# Patient Record
Sex: MALE
[De-identification: names, ages, dates, MRNs, and addresses within clinical notes are randomized per-mention and may not be internally consistent; named-entity substitution may affect disease eponyms.]

---

## 2017-01-13 ENCOUNTER — RX ONLY (RX ONLY)
Age: 37
End: 2017-01-13

## 2017-01-13 ENCOUNTER — DOCTOR'S OFFICE (OUTPATIENT)
Dept: URBAN - NONMETROPOLITAN AREA CLINIC 1 | Facility: CLINIC | Age: 37
Setting detail: OPHTHALMOLOGY
End: 2017-01-13
Payer: COMMERCIAL

## 2017-01-13 DIAGNOSIS — T15.01XA: ICD-10-CM

## 2017-01-13 PROCEDURE — 92004 COMPRE OPH EXAM NEW PT 1/>: CPT | Performed by: OPHTHALMOLOGY

## 2017-01-13 PROCEDURE — 65222 REMOVE FOREIGN BODY FROM EYE: CPT | Performed by: OPHTHALMOLOGY

## 2017-01-13 ASSESSMENT — CONFRONTATIONAL VISUAL FIELD TEST (CVF)
OS_FINDINGS: FULL
OD_FINDINGS: FULL

## 2017-01-13 ASSESSMENT — REFRACTION_MANIFEST
OS_VA2: 20/
OS_VA3: 20/
OD_VA2: 20/
OU_VA: 20/
OD_VA1: 20/
OS_VA1: 20/
OD_VA3: 20/
OD_VA1: 20/
OS_VA2: 20/
OS_VA1: 20/
OD_VA3: 20/
OD_VA2: 20/
OU_VA: 20/
OU_VA: 20/
OS_VA2: 20/
OD_VA1: 20/
OD_VA3: 20/
OS_VA1: 20/
OS_VA3: 20/
OD_VA2: 20/
OS_VA3: 20/

## 2017-01-13 ASSESSMENT — VISUAL ACUITY
OS_BCVA: 20/40-2
OD_BCVA: 20/30

## 2017-01-13 ASSESSMENT — REFRACTION_AUTOREFRACTION
OS_AXIS: 73
OD_CYLINDER: -0.25
OS_CYLINDER: -0.50
OD_AXIS: 20
OD_SPHERE: -2.25
OS_SPHERE: -1.75

## 2017-01-13 ASSESSMENT — REFRACTION_CURRENTRX
OD_OVR_VA: 20/
OD_OVR_VA: 20/
OS_OVR_VA: 20/
OS_OVR_VA: 20/
OD_OVR_VA: 20/
OS_OVR_VA: 20/

## 2017-01-13 ASSESSMENT — LID EXAM ASSESSMENTS: OD_COMMENTS: LID EVERTED NO FB NOTED

## 2017-01-13 ASSESSMENT — CORNEAL TRAUMA - FOREIGN BODY: OD_FOREIGNBODY: PRESENT

## 2017-01-13 ASSESSMENT — SPHEQUIV_DERIVED
OD_SPHEQUIV: -2.375
OS_SPHEQUIV: -2

## 2020-03-23 RX ORDER — PANTOPRAZOLE SODIUM 40 MG/1
40 TABLET, DELAYED RELEASE ORAL DAILY
COMMUNITY

## 2020-03-23 RX ORDER — IBUPROFEN 600 MG/1
TABLET ORAL EVERY 6 HOURS PRN
Status: ON HOLD | COMMUNITY
End: 2020-03-27 | Stop reason: SDUPTHER

## 2020-03-26 ENCOUNTER — ANESTHESIA EVENT (OUTPATIENT)
Dept: PERIOP | Facility: HOSPITAL | Age: 40
End: 2020-03-26
Payer: COMMERCIAL

## 2020-03-26 NOTE — ANESTHESIA PREPROCEDURE EVALUATION
Review of Systems/Medical History  Patient summary reviewed  Chart reviewed  No history of anesthetic complications     Cardiovascular  Negative cardio ROS    Pulmonary  Smoker ,        GI/Hepatic    GERD well controlled,             Endo/Other  Negative endo/other ROS   Obesity    GYN       Hematology  Negative hematology ROS      Musculoskeletal  Negative musculoskeletal ROS        Neurology  Negative neurology ROS      Psychology   Negative psychology ROS              Physical Exam    Airway    Mallampati score: II  TM Distance: >3 FB  Neck ROM: full     Dental       Cardiovascular  Comment: Negative ROS,     Pulmonary      Other Findings        Anesthesia Plan  ASA Score- 2     Anesthesia Type- general with ASA Monitors  Additional Monitors:   Airway Plan: ETT  Plan Factors-    Induction- intravenous  Postoperative Plan-     Informed Consent-   I personally reviewed this patient with the CRNA  Discussed and agreed on the Anesthesia Plan with the CRNA  Iraida Roth

## 2020-03-27 ENCOUNTER — HOSPITAL ENCOUNTER (OUTPATIENT)
Facility: HOSPITAL | Age: 40
Setting detail: OUTPATIENT SURGERY
Discharge: HOME | End: 2020-03-27
Attending: STUDENT IN AN ORGANIZED HEALTH CARE EDUCATION/TRAINING PROGRAM | Admitting: STUDENT IN AN ORGANIZED HEALTH CARE EDUCATION/TRAINING PROGRAM
Payer: COMMERCIAL

## 2020-03-27 ENCOUNTER — ANESTHESIA (OUTPATIENT)
Dept: PERIOP | Facility: HOSPITAL | Age: 40
End: 2020-03-27
Payer: COMMERCIAL

## 2020-03-27 VITALS
OXYGEN SATURATION: 97 % | WEIGHT: 280 LBS | HEART RATE: 69 BPM | RESPIRATION RATE: 17 BRPM | TEMPERATURE: 97.5 F | HEIGHT: 70 IN | BODY MASS INDEX: 40.09 KG/M2 | DIASTOLIC BLOOD PRESSURE: 66 MMHG | SYSTOLIC BLOOD PRESSURE: 163 MMHG

## 2020-03-27 DIAGNOSIS — K80.20 SYMPTOMATIC CHOLELITHIASIS: Primary | ICD-10-CM

## 2020-03-27 DIAGNOSIS — R10.13 EPIGASTRIC PAIN: ICD-10-CM

## 2020-03-27 PROCEDURE — 88304 TISSUE EXAM BY PATHOLOGIST: CPT | Performed by: PATHOLOGY

## 2020-03-27 PROCEDURE — 47562 LAPAROSCOPIC CHOLECYSTECTOMY: CPT | Performed by: PHYSICIAN ASSISTANT

## 2020-03-27 RX ORDER — SODIUM CHLORIDE 9 MG/ML
INJECTION, SOLUTION INTRAVENOUS AS NEEDED
Status: DISCONTINUED | OUTPATIENT
Start: 2020-03-27 | End: 2020-03-27 | Stop reason: HOSPADM

## 2020-03-27 RX ORDER — LIDOCAINE HYDROCHLORIDE 10 MG/ML
INJECTION, SOLUTION EPIDURAL; INFILTRATION; INTRACAUDAL; PERINEURAL AS NEEDED
Status: DISCONTINUED | OUTPATIENT
Start: 2020-03-27 | End: 2020-03-27 | Stop reason: SURG

## 2020-03-27 RX ORDER — NEOSTIGMINE METHYLSULFATE 1 MG/ML
INJECTION INTRAVENOUS AS NEEDED
Status: DISCONTINUED | OUTPATIENT
Start: 2020-03-27 | End: 2020-03-27 | Stop reason: SURG

## 2020-03-27 RX ORDER — KETOROLAC TROMETHAMINE 30 MG/ML
INJECTION, SOLUTION INTRAMUSCULAR; INTRAVENOUS AS NEEDED
Status: DISCONTINUED | OUTPATIENT
Start: 2020-03-27 | End: 2020-03-27 | Stop reason: SURG

## 2020-03-27 RX ORDER — ROCURONIUM BROMIDE 10 MG/ML
INJECTION, SOLUTION INTRAVENOUS AS NEEDED
Status: DISCONTINUED | OUTPATIENT
Start: 2020-03-27 | End: 2020-03-27 | Stop reason: SURG

## 2020-03-27 RX ORDER — SUCCINYLCHOLINE/SOD CL,ISO/PF 100 MG/5ML
SYRINGE (ML) INTRAVENOUS AS NEEDED
Status: DISCONTINUED | OUTPATIENT
Start: 2020-03-27 | End: 2020-03-27 | Stop reason: SURG

## 2020-03-27 RX ORDER — MIDAZOLAM HYDROCHLORIDE 2 MG/2ML
INJECTION, SOLUTION INTRAMUSCULAR; INTRAVENOUS AS NEEDED
Status: DISCONTINUED | OUTPATIENT
Start: 2020-03-27 | End: 2020-03-27 | Stop reason: SURG

## 2020-03-27 RX ORDER — CEFAZOLIN SODIUM 1 G/3ML
INJECTION, POWDER, FOR SOLUTION INTRAMUSCULAR; INTRAVENOUS AS NEEDED
Status: DISCONTINUED | OUTPATIENT
Start: 2020-03-27 | End: 2020-03-27 | Stop reason: SURG

## 2020-03-27 RX ORDER — LIDOCAINE HYDROCHLORIDE AND EPINEPHRINE 10; 10 MG/ML; UG/ML
INJECTION, SOLUTION INFILTRATION; PERINEURAL AS NEEDED
Status: DISCONTINUED | OUTPATIENT
Start: 2020-03-27 | End: 2020-03-27 | Stop reason: HOSPADM

## 2020-03-27 RX ORDER — FENTANYL CITRATE 50 UG/ML
INJECTION, SOLUTION INTRAMUSCULAR; INTRAVENOUS AS NEEDED
Status: DISCONTINUED | OUTPATIENT
Start: 2020-03-27 | End: 2020-03-27 | Stop reason: SURG

## 2020-03-27 RX ORDER — GLYCOPYRROLATE 0.2 MG/ML
INJECTION INTRAMUSCULAR; INTRAVENOUS AS NEEDED
Status: DISCONTINUED | OUTPATIENT
Start: 2020-03-27 | End: 2020-03-27 | Stop reason: SURG

## 2020-03-27 RX ORDER — PROPOFOL 10 MG/ML
INJECTION, EMULSION INTRAVENOUS AS NEEDED
Status: DISCONTINUED | OUTPATIENT
Start: 2020-03-27 | End: 2020-03-27 | Stop reason: SURG

## 2020-03-27 RX ORDER — IBUPROFEN 600 MG/1
600 TABLET ORAL EVERY 6 HOURS PRN
Qty: 30 TABLET | Refills: 0 | Status: SHIPPED | OUTPATIENT
Start: 2020-03-27

## 2020-03-27 RX ORDER — ACETAMINOPHEN 500 MG
1000 TABLET ORAL EVERY 6 HOURS PRN
COMMUNITY

## 2020-03-27 RX ORDER — SODIUM CHLORIDE, SODIUM LACTATE, POTASSIUM CHLORIDE, CALCIUM CHLORIDE 600; 310; 30; 20 MG/100ML; MG/100ML; MG/100ML; MG/100ML
50 INJECTION, SOLUTION INTRAVENOUS CONTINUOUS
Status: DISCONTINUED | OUTPATIENT
Start: 2020-03-27 | End: 2020-03-27 | Stop reason: HOSPADM

## 2020-03-27 RX ORDER — ONDANSETRON 2 MG/ML
INJECTION INTRAMUSCULAR; INTRAVENOUS AS NEEDED
Status: DISCONTINUED | OUTPATIENT
Start: 2020-03-27 | End: 2020-03-27 | Stop reason: SURG

## 2020-03-27 RX ORDER — FENTANYL CITRATE/PF 50 MCG/ML
50 SYRINGE (ML) INJECTION
Status: DISCONTINUED | OUTPATIENT
Start: 2020-03-27 | End: 2020-03-27 | Stop reason: HOSPADM

## 2020-03-27 RX ORDER — DEXAMETHASONE SODIUM PHOSPHATE 10 MG/ML
INJECTION, SOLUTION INTRAMUSCULAR; INTRAVENOUS AS NEEDED
Status: DISCONTINUED | OUTPATIENT
Start: 2020-03-27 | End: 2020-03-27 | Stop reason: SURG

## 2020-03-27 RX ORDER — METOCLOPRAMIDE HYDROCHLORIDE 5 MG/ML
10 INJECTION INTRAMUSCULAR; INTRAVENOUS ONCE AS NEEDED
Status: DISCONTINUED | OUTPATIENT
Start: 2020-03-27 | End: 2020-03-27 | Stop reason: HOSPADM

## 2020-03-27 RX ORDER — ONDANSETRON 2 MG/ML
4 INJECTION INTRAMUSCULAR; INTRAVENOUS ONCE AS NEEDED
Status: DISCONTINUED | OUTPATIENT
Start: 2020-03-27 | End: 2020-03-27 | Stop reason: HOSPADM

## 2020-03-27 RX ORDER — CEFAZOLIN SODIUM 2 G/50ML
2000 SOLUTION INTRAVENOUS
Status: COMPLETED | OUTPATIENT
Start: 2020-03-27 | End: 2020-03-27

## 2020-03-27 RX ADMIN — LIDOCAINE HYDROCHLORIDE 50 MG: 10 INJECTION, SOLUTION EPIDURAL; INFILTRATION; INTRACAUDAL; PERINEURAL at 07:42

## 2020-03-27 RX ADMIN — FENTANYL CITRATE 50 MCG: 50 INJECTION, SOLUTION INTRAMUSCULAR; INTRAVENOUS at 08:16

## 2020-03-27 RX ADMIN — ROCURONIUM BROMIDE 10 MG: 10 INJECTION, SOLUTION INTRAVENOUS at 07:42

## 2020-03-27 RX ADMIN — ROCURONIUM BROMIDE 40 MG: 10 INJECTION, SOLUTION INTRAVENOUS at 07:46

## 2020-03-27 RX ADMIN — FENTANYL CITRATE 50 MCG: 50 INJECTION, SOLUTION INTRAMUSCULAR; INTRAVENOUS at 07:42

## 2020-03-27 RX ADMIN — ONDANSETRON 4 MG: 2 INJECTION INTRAMUSCULAR; INTRAVENOUS at 09:00

## 2020-03-27 RX ADMIN — ROCURONIUM BROMIDE 20 MG: 10 INJECTION, SOLUTION INTRAVENOUS at 08:16

## 2020-03-27 RX ADMIN — CEFAZOLIN 1000 MG: 1 INJECTION, POWDER, FOR SOLUTION INTRAVENOUS at 07:48

## 2020-03-27 RX ADMIN — NEOSTIGMINE METHYLSULFATE 4 MG: 1 INJECTION, SOLUTION INTRAVENOUS at 09:02

## 2020-03-27 RX ADMIN — FENTANYL CITRATE 50 MCG: 50 INJECTION, SOLUTION INTRAMUSCULAR; INTRAVENOUS at 08:27

## 2020-03-27 RX ADMIN — MIDAZOLAM HYDROCHLORIDE 2 MG: 1 INJECTION, SOLUTION INTRAMUSCULAR; INTRAVENOUS at 07:36

## 2020-03-27 RX ADMIN — PROPOFOL 30 MG: 10 INJECTION, EMULSION INTRAVENOUS at 09:15

## 2020-03-27 RX ADMIN — FENTANYL CITRATE 50 MCG: 50 INJECTION, SOLUTION INTRAMUSCULAR; INTRAVENOUS at 08:05

## 2020-03-27 RX ADMIN — GLYCOPYRROLATE 0.4 MG: 0.2 INJECTION, SOLUTION INTRAMUSCULAR; INTRAVENOUS at 09:02

## 2020-03-27 RX ADMIN — DEXAMETHASONE SODIUM PHOSPHATE 4 MG: 10 INJECTION, SOLUTION INTRAMUSCULAR; INTRAVENOUS at 07:52

## 2020-03-27 RX ADMIN — SODIUM CHLORIDE, SODIUM LACTATE, POTASSIUM CHLORIDE, AND CALCIUM CHLORIDE: .6; .31; .03; .02 INJECTION, SOLUTION INTRAVENOUS at 08:34

## 2020-03-27 RX ADMIN — KETOROLAC TROMETHAMINE 30 MG: 30 INJECTION, SOLUTION INTRAMUSCULAR at 09:07

## 2020-03-27 RX ADMIN — Medication 140 MG: at 07:42

## 2020-03-27 RX ADMIN — SODIUM CHLORIDE, SODIUM LACTATE, POTASSIUM CHLORIDE, AND CALCIUM CHLORIDE 50 ML/HR: .6; .31; .03; .02 INJECTION, SOLUTION INTRAVENOUS at 07:32

## 2020-03-27 RX ADMIN — PROPOFOL 300 MG: 10 INJECTION, EMULSION INTRAVENOUS at 07:42

## 2020-03-27 RX ADMIN — FENTANYL CITRATE 50 MCG: 50 INJECTION, SOLUTION INTRAMUSCULAR; INTRAVENOUS at 09:21

## 2020-03-27 RX ADMIN — CEFAZOLIN SODIUM 2000 MG: 2 SOLUTION INTRAVENOUS at 07:36

## 2020-03-27 RX ADMIN — FENTANYL CITRATE 50 MCG: 50 INJECTION, SOLUTION INTRAMUSCULAR; INTRAVENOUS at 08:35

## 2020-03-27 NOTE — ANESTHESIA POSTPROCEDURE EVALUATION
Post-Op Assessment Note    CV Status:  Stable    Pain management: adequate     Mental Status:  Somnolent   Hydration Status:  Euvolemic   PONV Controlled:  Controlled   Airway Patency:  Patent   Post Op Vitals Reviewed: Yes      Staff: CRNA           BP (!) (P) 179/80 (03/27/20 0919)    Temp (P) 97 7 °F (36 5 °C) (03/27/20 0919)    Pulse 61   Resp 18   SpO2 98

## 2020-06-24 ENCOUNTER — APPOINTMENT (EMERGENCY)
Dept: CT IMAGING | Facility: HOSPITAL | Age: 40
End: 2020-06-24

## 2020-06-24 ENCOUNTER — HOSPITAL ENCOUNTER (EMERGENCY)
Facility: HOSPITAL | Age: 40
Discharge: HOME | End: 2020-06-25
Attending: EMERGENCY MEDICINE | Admitting: EMERGENCY MEDICINE

## 2020-06-24 DIAGNOSIS — N20.1 URETEROLITHIASIS: Primary | ICD-10-CM

## 2020-06-24 LAB
ALBUMIN SERPL BCP-MCNC: 4.1 G/DL (ref 3.5–5)
ALP SERPL-CCNC: 83 U/L (ref 46–116)
ALT SERPL W P-5'-P-CCNC: 61 U/L (ref 12–78)
ANION GAP SERPL CALCULATED.3IONS-SCNC: 8 MMOL/L (ref 4–13)
AST SERPL W P-5'-P-CCNC: 32 U/L (ref 5–45)
BACTERIA UR QL AUTO: ABNORMAL /HPF
BASOPHILS # BLD AUTO: 0.04 THOUSANDS/ΜL (ref 0–0.1)
BASOPHILS NFR BLD AUTO: 0 % (ref 0–1)
BILIRUB SERPL-MCNC: 0.6 MG/DL (ref 0.2–1)
BILIRUB UR QL STRIP: NEGATIVE
BUN SERPL-MCNC: 15 MG/DL (ref 5–25)
CALCIUM SERPL-MCNC: 8.9 MG/DL (ref 8.3–10.1)
CHLORIDE SERPL-SCNC: 102 MMOL/L (ref 100–108)
CLARITY UR: CLEAR
CO2 SERPL-SCNC: 28 MMOL/L (ref 21–32)
COLOR UR: YELLOW
CREAT SERPL-MCNC: 1.06 MG/DL (ref 0.6–1.3)
EOSINOPHIL # BLD AUTO: 0.2 THOUSAND/ΜL (ref 0–0.61)
EOSINOPHIL NFR BLD AUTO: 2 % (ref 0–6)
ERYTHROCYTE [DISTWIDTH] IN BLOOD BY AUTOMATED COUNT: 12.5 % (ref 11.6–15.1)
GFR SERPL CREATININE-BSD FRML MDRD: 87 ML/MIN/1.73SQ M
GLUCOSE SERPL-MCNC: 97 MG/DL (ref 65–140)
GLUCOSE UR STRIP-MCNC: NEGATIVE MG/DL
HCT VFR BLD AUTO: 46.2 % (ref 36.5–49.3)
HGB BLD-MCNC: 16 G/DL (ref 12–17)
HGB UR QL STRIP.AUTO: ABNORMAL
IMM GRANULOCYTES # BLD AUTO: 0.06 THOUSAND/UL (ref 0–0.2)
IMM GRANULOCYTES NFR BLD AUTO: 1 % (ref 0–2)
KETONES UR STRIP-MCNC: NEGATIVE MG/DL
LEUKOCYTE ESTERASE UR QL STRIP: NEGATIVE
LYMPHOCYTES # BLD AUTO: 2.03 THOUSANDS/ΜL (ref 0.6–4.47)
LYMPHOCYTES NFR BLD AUTO: 22 % (ref 14–44)
MCH RBC QN AUTO: 32.7 PG (ref 26.8–34.3)
MCHC RBC AUTO-ENTMCNC: 34.6 G/DL (ref 31.4–37.4)
MCV RBC AUTO: 94 FL (ref 82–98)
MONOCYTES # BLD AUTO: 0.92 THOUSAND/ΜL (ref 0.17–1.22)
MONOCYTES NFR BLD AUTO: 10 % (ref 4–12)
NEUTROPHILS # BLD AUTO: 5.83 THOUSANDS/ΜL (ref 1.85–7.62)
NEUTS SEG NFR BLD AUTO: 65 % (ref 43–75)
NITRITE UR QL STRIP: NEGATIVE
NON-SQ EPI CELLS URNS QL MICRO: ABNORMAL /HPF
NRBC BLD AUTO-RTO: 0 /100 WBCS
PH UR STRIP.AUTO: 6 [PH]
PLATELET # BLD AUTO: 233 THOUSANDS/UL (ref 149–390)
PMV BLD AUTO: 10.4 FL (ref 8.9–12.7)
POTASSIUM SERPL-SCNC: 3.8 MMOL/L (ref 3.5–5.3)
PROT SERPL-MCNC: 7.4 G/DL (ref 6.4–8.2)
PROT UR STRIP-MCNC: NEGATIVE MG/DL
RBC # BLD AUTO: 4.9 MILLION/UL (ref 3.88–5.62)
RBC #/AREA URNS AUTO: ABNORMAL /HPF
SODIUM SERPL-SCNC: 138 MMOL/L (ref 136–145)
SP GR UR STRIP.AUTO: <=1.005 (ref 1–1.03)
UROBILINOGEN UR QL STRIP.AUTO: 0.2 E.U./DL
WBC # BLD AUTO: 9.08 THOUSAND/UL (ref 4.31–10.16)
WBC #/AREA URNS AUTO: ABNORMAL /HPF

## 2020-06-24 PROCEDURE — 81001 URINALYSIS AUTO W/SCOPE: CPT | Performed by: EMERGENCY MEDICINE

## 2020-06-24 PROCEDURE — 96374 THER/PROPH/DIAG INJ IV PUSH: CPT

## 2020-06-24 PROCEDURE — 85025 COMPLETE CBC W/AUTO DIFF WBC: CPT | Performed by: EMERGENCY MEDICINE

## 2020-06-24 PROCEDURE — 99284 EMERGENCY DEPT VISIT MOD MDM: CPT | Performed by: EMERGENCY MEDICINE

## 2020-06-24 PROCEDURE — 87086 URINE CULTURE/COLONY COUNT: CPT | Performed by: EMERGENCY MEDICINE

## 2020-06-24 PROCEDURE — 74176 CT ABD & PELVIS W/O CONTRAST: CPT

## 2020-06-24 PROCEDURE — 96361 HYDRATE IV INFUSION ADD-ON: CPT

## 2020-06-24 PROCEDURE — 96375 TX/PRO/DX INJ NEW DRUG ADDON: CPT

## 2020-06-24 PROCEDURE — 80053 COMPREHEN METABOLIC PANEL: CPT | Performed by: EMERGENCY MEDICINE

## 2020-06-24 PROCEDURE — 99284 EMERGENCY DEPT VISIT MOD MDM: CPT

## 2020-06-24 RX ORDER — TAMSULOSIN HYDROCHLORIDE 0.4 MG/1
0.4 CAPSULE ORAL
Qty: 7 CAPSULE | Refills: 0 | Status: SHIPPED | OUTPATIENT
Start: 2020-06-24 | End: 2020-07-01

## 2020-06-24 RX ORDER — ONDANSETRON 2 MG/ML
4 INJECTION INTRAMUSCULAR; INTRAVENOUS ONCE
Status: COMPLETED | OUTPATIENT
Start: 2020-06-24 | End: 2020-06-24

## 2020-06-24 RX ORDER — ONDANSETRON 4 MG/1
4 TABLET, ORALLY DISINTEGRATING ORAL EVERY 8 HOURS PRN
Qty: 20 TABLET | Refills: 0 | Status: SHIPPED | OUTPATIENT
Start: 2020-06-24

## 2020-06-24 RX ORDER — KETOROLAC TROMETHAMINE 30 MG/ML
15 INJECTION, SOLUTION INTRAMUSCULAR; INTRAVENOUS ONCE
Status: COMPLETED | OUTPATIENT
Start: 2020-06-24 | End: 2020-06-24

## 2020-06-24 RX ORDER — TAMSULOSIN HYDROCHLORIDE 0.4 MG/1
0.4 CAPSULE ORAL ONCE
Status: COMPLETED | OUTPATIENT
Start: 2020-06-24 | End: 2020-06-24

## 2020-06-24 RX ADMIN — ONDANSETRON 4 MG: 2 INJECTION INTRAMUSCULAR; INTRAVENOUS at 22:46

## 2020-06-24 RX ADMIN — SODIUM CHLORIDE 1000 ML: 0.9 INJECTION, SOLUTION INTRAVENOUS at 22:44

## 2020-06-24 RX ADMIN — TAMSULOSIN HYDROCHLORIDE 0.4 MG: 0.4 CAPSULE ORAL at 23:54

## 2020-06-24 RX ADMIN — KETOROLAC TROMETHAMINE 15 MG: 30 INJECTION, SOLUTION INTRAMUSCULAR at 22:46

## 2020-06-25 VITALS
DIASTOLIC BLOOD PRESSURE: 86 MMHG | HEART RATE: 71 BPM | SYSTOLIC BLOOD PRESSURE: 136 MMHG | TEMPERATURE: 97.6 F | BODY MASS INDEX: 40.17 KG/M2 | RESPIRATION RATE: 16 BRPM | WEIGHT: 279.98 LBS | OXYGEN SATURATION: 98 %

## 2020-06-26 LAB — BACTERIA UR CULT: NORMAL

## 2020-06-30 ENCOUNTER — HOSPITAL ENCOUNTER (EMERGENCY)
Facility: HOSPITAL | Age: 40
Discharge: HOME | End: 2020-06-30
Attending: EMERGENCY MEDICINE | Admitting: EMERGENCY MEDICINE

## 2020-06-30 ENCOUNTER — APPOINTMENT (EMERGENCY)
Dept: CT IMAGING | Facility: HOSPITAL | Age: 40
End: 2020-06-30

## 2020-06-30 VITALS
DIASTOLIC BLOOD PRESSURE: 86 MMHG | OXYGEN SATURATION: 6 % | SYSTOLIC BLOOD PRESSURE: 155 MMHG | HEART RATE: 71 BPM | TEMPERATURE: 98.2 F | BODY MASS INDEX: 40.17 KG/M2 | RESPIRATION RATE: 19 BRPM | WEIGHT: 279.98 LBS

## 2020-06-30 DIAGNOSIS — N20.0 NEPHROLITHIASIS: ICD-10-CM

## 2020-06-30 DIAGNOSIS — N39.0 UTI (URINARY TRACT INFECTION): Primary | ICD-10-CM

## 2020-06-30 LAB
ANION GAP SERPL CALCULATED.3IONS-SCNC: 11 MMOL/L (ref 4–13)
BACTERIA UR QL AUTO: ABNORMAL /HPF
BASOPHILS # BLD AUTO: 0.04 THOUSANDS/ΜL (ref 0–0.1)
BASOPHILS NFR BLD AUTO: 0 % (ref 0–1)
BILIRUB UR QL STRIP: NEGATIVE
BUN SERPL-MCNC: 20 MG/DL (ref 5–25)
CALCIUM SERPL-MCNC: 9.2 MG/DL (ref 8.3–10.1)
CHLORIDE SERPL-SCNC: 103 MMOL/L (ref 100–108)
CLARITY UR: ABNORMAL
CO2 SERPL-SCNC: 24 MMOL/L (ref 21–32)
COLOR UR: YELLOW
CREAT SERPL-MCNC: 1.48 MG/DL (ref 0.6–1.3)
EOSINOPHIL # BLD AUTO: 0.11 THOUSAND/ΜL (ref 0–0.61)
EOSINOPHIL NFR BLD AUTO: 1 % (ref 0–6)
ERYTHROCYTE [DISTWIDTH] IN BLOOD BY AUTOMATED COUNT: 12.7 % (ref 11.6–15.1)
GFR SERPL CREATININE-BSD FRML MDRD: 58 ML/MIN/1.73SQ M
GLUCOSE SERPL-MCNC: 106 MG/DL (ref 65–140)
GLUCOSE UR STRIP-MCNC: NEGATIVE MG/DL
HCT VFR BLD AUTO: 46 % (ref 36.5–49.3)
HGB BLD-MCNC: 16 G/DL (ref 12–17)
HGB UR QL STRIP.AUTO: ABNORMAL
IMM GRANULOCYTES # BLD AUTO: 0.07 THOUSAND/UL (ref 0–0.2)
IMM GRANULOCYTES NFR BLD AUTO: 1 % (ref 0–2)
KETONES UR STRIP-MCNC: ABNORMAL MG/DL
LEUKOCYTE ESTERASE UR QL STRIP: NEGATIVE
LYMPHOCYTES # BLD AUTO: 1.74 THOUSANDS/ΜL (ref 0.6–4.47)
LYMPHOCYTES NFR BLD AUTO: 13 % (ref 14–44)
MCH RBC QN AUTO: 32.3 PG (ref 26.8–34.3)
MCHC RBC AUTO-ENTMCNC: 34.8 G/DL (ref 31.4–37.4)
MCV RBC AUTO: 93 FL (ref 82–98)
MONOCYTES # BLD AUTO: 1.26 THOUSAND/ΜL (ref 0.17–1.22)
MONOCYTES NFR BLD AUTO: 10 % (ref 4–12)
NEUTROPHILS # BLD AUTO: 9.79 THOUSANDS/ΜL (ref 1.85–7.62)
NEUTS SEG NFR BLD AUTO: 75 % (ref 43–75)
NITRITE UR QL STRIP: NEGATIVE
NON-SQ EPI CELLS URNS QL MICRO: ABNORMAL /HPF
NRBC BLD AUTO-RTO: 0 /100 WBCS
PH UR STRIP.AUTO: 6 [PH]
PLATELET # BLD AUTO: 249 THOUSANDS/UL (ref 149–390)
PMV BLD AUTO: 10.4 FL (ref 8.9–12.7)
POTASSIUM SERPL-SCNC: 3.9 MMOL/L (ref 3.5–5.3)
PROT UR STRIP-MCNC: ABNORMAL MG/DL
RBC # BLD AUTO: 4.95 MILLION/UL (ref 3.88–5.62)
RBC #/AREA URNS AUTO: ABNORMAL /HPF
SODIUM SERPL-SCNC: 138 MMOL/L (ref 136–145)
SP GR UR STRIP.AUTO: >=1.03 (ref 1–1.03)
UROBILINOGEN UR QL STRIP.AUTO: 0.2 E.U./DL
WBC # BLD AUTO: 13.01 THOUSAND/UL (ref 4.31–10.16)
WBC #/AREA URNS AUTO: ABNORMAL /HPF

## 2020-06-30 PROCEDURE — 36415 COLL VENOUS BLD VENIPUNCTURE: CPT | Performed by: EMERGENCY MEDICINE

## 2020-06-30 PROCEDURE — 99284 EMERGENCY DEPT VISIT MOD MDM: CPT

## 2020-06-30 PROCEDURE — 96375 TX/PRO/DX INJ NEW DRUG ADDON: CPT

## 2020-06-30 PROCEDURE — 96361 HYDRATE IV INFUSION ADD-ON: CPT

## 2020-06-30 PROCEDURE — 81001 URINALYSIS AUTO W/SCOPE: CPT | Performed by: EMERGENCY MEDICINE

## 2020-06-30 PROCEDURE — 96365 THER/PROPH/DIAG IV INF INIT: CPT

## 2020-06-30 PROCEDURE — 80048 BASIC METABOLIC PNL TOTAL CA: CPT | Performed by: EMERGENCY MEDICINE

## 2020-06-30 PROCEDURE — 74176 CT ABD & PELVIS W/O CONTRAST: CPT

## 2020-06-30 PROCEDURE — 99285 EMERGENCY DEPT VISIT HI MDM: CPT | Performed by: EMERGENCY MEDICINE

## 2020-06-30 PROCEDURE — 85025 COMPLETE CBC W/AUTO DIFF WBC: CPT | Performed by: EMERGENCY MEDICINE

## 2020-06-30 RX ORDER — KETOROLAC TROMETHAMINE 30 MG/ML
15 INJECTION, SOLUTION INTRAMUSCULAR; INTRAVENOUS ONCE
Status: COMPLETED | OUTPATIENT
Start: 2020-06-30 | End: 2020-06-30

## 2020-06-30 RX ORDER — CEPHALEXIN 500 MG/1
500 CAPSULE ORAL 4 TIMES DAILY
Qty: 28 CAPSULE | Refills: 0 | Status: SHIPPED | OUTPATIENT
Start: 2020-06-30 | End: 2020-07-07

## 2020-06-30 RX ORDER — TAMSULOSIN HYDROCHLORIDE 0.4 MG/1
0.4 CAPSULE ORAL
Qty: 7 CAPSULE | Refills: 0 | Status: SHIPPED | OUTPATIENT
Start: 2020-06-30 | End: 2020-07-07

## 2020-06-30 RX ORDER — OXYCODONE HYDROCHLORIDE 5 MG/1
5 TABLET ORAL EVERY 4 HOURS PRN
Qty: 10 TABLET | Refills: 0 | Status: SHIPPED | OUTPATIENT
Start: 2020-06-30

## 2020-06-30 RX ORDER — ACETAMINOPHEN 325 MG/1
650 TABLET ORAL ONCE
Status: COMPLETED | OUTPATIENT
Start: 2020-06-30 | End: 2020-06-30

## 2020-06-30 RX ORDER — ONDANSETRON 4 MG/1
4 TABLET, ORALLY DISINTEGRATING ORAL EVERY 6 HOURS PRN
Qty: 20 TABLET | Refills: 0 | Status: SHIPPED | OUTPATIENT
Start: 2020-06-30

## 2020-06-30 RX ORDER — HYDROMORPHONE HCL/PF 1 MG/ML
0.5 SYRINGE (ML) INJECTION ONCE
Status: COMPLETED | OUTPATIENT
Start: 2020-06-30 | End: 2020-06-30

## 2020-06-30 RX ORDER — CEFTRIAXONE 1 G/50ML
1000 INJECTION, SOLUTION INTRAVENOUS ONCE
Status: COMPLETED | OUTPATIENT
Start: 2020-06-30 | End: 2020-06-30

## 2020-06-30 RX ORDER — ONDANSETRON 2 MG/ML
4 INJECTION INTRAMUSCULAR; INTRAVENOUS ONCE
Status: COMPLETED | OUTPATIENT
Start: 2020-06-30 | End: 2020-06-30

## 2020-06-30 RX ADMIN — CEFTRIAXONE 1000 MG: 1 INJECTION, SOLUTION INTRAVENOUS at 03:08

## 2020-06-30 RX ADMIN — ACETAMINOPHEN 650 MG: 325 TABLET, FILM COATED ORAL at 03:07

## 2020-06-30 RX ADMIN — HYDROMORPHONE HYDROCHLORIDE 0.5 MG: 1 INJECTION, SOLUTION INTRAMUSCULAR; INTRAVENOUS; SUBCUTANEOUS at 03:05

## 2020-06-30 RX ADMIN — KETOROLAC TROMETHAMINE 15 MG: 30 INJECTION, SOLUTION INTRAMUSCULAR at 01:32

## 2020-06-30 RX ADMIN — SODIUM CHLORIDE 1000 ML: 0.9 INJECTION, SOLUTION INTRAVENOUS at 02:10

## 2020-06-30 RX ADMIN — ONDANSETRON 4 MG: 2 INJECTION INTRAMUSCULAR; INTRAVENOUS at 01:35

## 2020-06-30 NOTE — ED PROVIDER NOTES
History  Chief Complaint   Patient presents with    Flank Pain     Pt was seen here last week for kidney stones and thinks he passed it  Now c/o severe L sided flank pain  Motrin at 2000  HPI  37year old man presents with left flank pain  Patient was seen June 24th and had obstructing stone at the left UVJ  He was discharged and has not seen Urology  Patient has had intermittent pain since then that got acutely worse tonight  He thinks he might have passed his stone  He denies any fevers chills  Does endorse nausea and vomiting  Prior to Admission Medications   Prescriptions Last Dose Informant Patient Reported? Taking?   acetaminophen (TYLENOL) 500 mg tablet  Self Yes No   Sig: Take 1,000 mg by mouth every 6 (six) hours as needed for mild pain or moderate pain   ibuprofen (MOTRIN) 600 mg tablet   No Yes   Sig: Take 1 tablet (600 mg total) by mouth every 6 (six) hours as needed for mild pain or moderate pain   ondansetron (ZOFRAN-ODT) 4 mg disintegrating tablet Not Taking at Unknown time  No No   Sig: Take 1 tablet (4 mg total) by mouth every 8 (eight) hours as needed for nausea or vomiting for up to 20 doses   Patient not taking: Reported on 6/30/2020   pantoprazole (PROTONIX) 40 mg tablet   Yes Yes   Sig: Take 40 mg by mouth daily   tamsulosin (FLOMAX) 0 4 mg   No Yes   Sig: Take 1 capsule (0 4 mg total) by mouth daily with dinner for 7 days      Facility-Administered Medications: None       Past Medical History:   Diagnosis Date    Cholelithiases     False positive stress test     Pt states his PCP had him do a stress test  Pt had a false +  Had a clean cardiac cath   GERD (gastroesophageal reflux disease)     Pt takes Protonix   Loss of teeth due to extraction     Pneumonia     1/20       Past Surgical History:   Procedure Laterality Date    CARDIAC SURGERY      cardiac cath done after abnormal stress test  Clean cath      FOREIGN BODY REMOVAL Right     Pt had glass removed from 5th finger    [place] LAP,CHOLECYSTECTOMY N/A 3/27/2020    Procedure: LAPAROSCOPIC CHOLECYSTECTOMY;  Surgeon: Cayetano Montoya DO;  Location:  MAIN OR;  Service: General    WISDOM TOOTH EXTRACTION         History reviewed  No pertinent family history  I have reviewed and agree with the history as documented  E-Cigarette/Vaping    E-Cigarette Use Never User      E-Cigarette/Vaping Substances     Social History     Tobacco Use    Smoking status: Current Every Day Smoker     Packs/day: 2 00     Years: 20 00     Pack years: 40 00     Types: Cigarettes    Smokeless tobacco: Never Used    Tobacco comment: Not ready to quit   Substance Use Topics    Alcohol use: Yes     Frequency: 2-4 times a month    Drug use: Not Currently       Review of Systems   Constitutional: Negative  Negative for chills and fever  HENT: Negative  Negative for ear pain and sore throat  Eyes: Negative  Negative for pain and discharge  Respiratory: Negative  Negative for chest tightness and shortness of breath  Cardiovascular: Negative  Negative for chest pain and palpitations  Gastrointestinal: Negative  Negative for abdominal pain, nausea and vomiting  Endocrine: Negative  Negative for polyphagia and polyuria  Genitourinary: Positive for flank pain  Negative for dysuria  Musculoskeletal: Negative for arthralgias and back pain  Skin: Negative  Negative for color change and wound  Allergic/Immunologic: Negative  Negative for food allergies and immunocompromised state  Neurological: Negative  Negative for weakness and headaches  Hematological: Negative  Negative for adenopathy  Does not bruise/bleed easily  Psychiatric/Behavioral: Negative  Negative for suicidal ideas  The patient is not nervous/anxious  Physical Exam  Physical Exam   Constitutional: He is oriented to person, place, and time  He appears well-developed and well-nourished  No distress  HENT:   Head: Normocephalic and atraumatic  Right Ear: External ear normal    Left Ear: External ear normal    Mouth/Throat: Oropharynx is clear and moist    Eyes: Pupils are equal, round, and reactive to light  Conjunctivae and EOM are normal  Right eye exhibits no discharge  Left eye exhibits no discharge  No scleral icterus  Neck: Normal range of motion  Neck supple  No tracheal deviation present  No thyromegaly present  Cardiovascular: Normal rate, regular rhythm and intact distal pulses  Exam reveals no gallop and no friction rub  No murmur heard  Pulmonary/Chest: Effort normal and breath sounds normal  No stridor  No respiratory distress  He has no wheezes  He has no rales  Abdominal: Soft  Bowel sounds are normal  He exhibits no distension  There is no tenderness  There is no rebound and no guarding  Musculoskeletal: Normal range of motion  He exhibits no edema, tenderness or deformity  He has no tenderness of the right flank  Neurological: He is alert and oriented to person, place, and time  No cranial nerve deficit  Skin: Skin is warm and dry  No rash noted  He is not diaphoretic  No erythema  Psychiatric: He has a normal mood and affect  His behavior is normal  Thought content normal    Nursing note and vitals reviewed        Vital Signs  ED Triage Vitals [06/30/20 0104]   Temperature Pulse Respirations Blood Pressure SpO2   98 2 °F (36 8 °C) 69 20 (!) 175/95 97 %      Temp Source Heart Rate Source Patient Position - Orthostatic VS BP Location FiO2 (%)   Temporal Monitor Lying Right arm --      Pain Score       Worst Possible Pain           Vitals:    06/30/20 0104 06/30/20 0312 06/30/20 0356   BP: (!) 175/95 155/86 155/86   Pulse: 69 71 71   Patient Position - Orthostatic VS: Lying Sitting Sitting         Visual Acuity      ED Medications  Medications   ketorolac (TORADOL) injection 15 mg (15 mg Intravenous Given 6/30/20 0132)   ondansetron (ZOFRAN) injection 4 mg (4 mg Intravenous Given 6/30/20 0135)   sodium chloride 0 9 % bolus 1,000 mL (0 mL Intravenous Stopped 6/30/20 0341)   HYDROmorphone (DILAUDID) injection 0 5 mg (0 5 mg Intravenous Given 6/30/20 0305)   acetaminophen (TYLENOL) tablet 650 mg (650 mg Oral Given 6/30/20 0307)   cefTRIAXone (ROCEPHIN) IVPB (premix) 1,000 mg 50 mL (0 mg Intravenous Stopped 6/30/20 0341)       Diagnostic Studies  Results Reviewed     Procedure Component Value Units Date/Time    Basic metabolic panel [[id number]]  (Abnormal) Collected:  06/30/20 0125    Lab Status:  Final result Specimen:  Blood from Arm, Right Updated:  06/30/20 0156     Sodium 138 mmol/L      Potassium 3 9 mmol/L      Chloride 103 mmol/L      CO2 24 mmol/L      ANION GAP 11 mmol/L      BUN 20 mg/dL      Creatinine 1 48 mg/dL      Glucose 106 mg/dL      Calcium 9 2 mg/dL      eGFR 58 ml/min/1 73sq m     Narrative:       [place] guidelines for Chronic Kidney Disease (CKD):     Stage 1 with normal or high GFR (GFR > 90 mL/min/1 73 square meters)    Stage 2 Mild CKD (GFR = 60-89 mL/min/1 73 square meters)    Stage 3A Moderate CKD (GFR = 45-59 mL/min/1 73 square meters)    Stage 3B Moderate CKD (GFR = 30-44 mL/min/1 73 square meters)    Stage 4 Severe CKD (GFR = 15-29 mL/min/1 73 square meters)    Stage 5 End Stage CKD (GFR <15 mL/min/1 73 square meters)  Note: GFR calculation is accurate only with a steady state creatinine    Urine Microscopic [[id number]]  (Abnormal) Collected:  06/30/20 0125    Lab Status:  Final result Specimen:  Urine, Clean Catch Updated:  06/30/20 0144     RBC, UA Innumerable /hpf      WBC, UA None Seen /hpf      Epithelial Cells Occasional /hpf      Bacteria, UA Moderate /hpf     UA w Reflex to Microscopic w Reflex to Culture [[id number]]  (Abnormal) Collected:  06/30/20 0125    Lab Status:  Final result Specimen:  Urine, Clean Catch Updated:  06/30/20 0135     Color, UA Yellow     Clarity, UA Cloudy     Specific Gravity, UA >=1 030     pH, UA 6 0     Leukocytes, UA Negative Nitrite, UA Negative     Protein,  (2+) mg/dl      Glucose, UA Negative mg/dl      Ketones, UA Trace mg/dl      Urobilinogen, UA 0 2 E U /dl      Bilirubin, UA Negative     Blood, UA Moderate    CBC and differential [[id number]]  (Abnormal) Collected:  06/30/20 0125    Lab Status:  Final result Specimen:  Blood from Arm, Right Updated:  06/30/20 0133     WBC 13 01 Thousand/uL      RBC 4 95 Million/uL      Hemoglobin 16 0 g/dL      Hematocrit 46 0 %      MCV 93 fL      MCH 32 3 pg      MCHC 34 8 g/dL      RDW 12 7 %      MPV 10 4 fL      Platelets 358 Thousands/uL      nRBC 0 /100 WBCs      Neutrophils Relative 75 %      Immat GRANS % 1 %      Lymphocytes Relative 13 %      Monocytes Relative 10 %      Eosinophils Relative 1 %      Basophils Relative 0 %      Neutrophils Absolute 9 79 Thousands/µL      Immature Grans Absolute 0 07 Thousand/uL      Lymphocytes Absolute 1 74 Thousands/µL      Monocytes Absolute 1 26 Thousand/µL      Eosinophils Absolute 0 11 Thousand/µL      Basophils Absolute 0 04 Thousands/µL                  CT renal stone study abdomen pelvis without contrast   Final Result by  (06/30 0428)   Addendum 1 of 1 by Noni Patino, DO (06/30 0220)   ADDENDUM:      Mild left-sided hydroureteronephrosis with a 3 mm obstructing stone within    the urinary bladder  Final                 Procedures  Procedures         ED Course      urine has bacteria, and the kidney stone has moved into the bladder  Case was discussed via [facility] text with Urology, Dr Renita Salguero, there is nothing that Urology would do beyond antibiotics  Urology will call the patient morning  Will discharge the patient on antibiotics,, pain control, Zofran  I personally discussed return precautions with this patient and family   I provided the patient with written discharge instructions and particularly highlighted specific areas of interest to this patient, including but not limited to: medications for symptom managment, follow up recommendations, and return precautions  Patient and family are in agreement with this plan as outlined above  US AUDIT      Most Recent Value   Initial Alcohol Screen: US AUDIT-C    1  How often do you have a drink containing alcohol?  0 Filed at: 06/30/2020 0105   2  How many drinks containing alcohol do you have on a typical day you are drinking? 0 Filed at: 06/30/2020 0105   3a  Male UNDER 65: How often do you have five or more drinks on one occasion? 0 Filed at: 06/30/2020 0105   3b  FEMALE Any Age, or MALE 65+: How often do you have 4 or more drinks on one occassion? 0 Filed at: 06/30/2020 0105   Audit-C Score  0 Filed at: 06/30/2020 0105                  TRU/DAST-10      Most Recent Value   How many times in the past year have you    Used an illegal drug or used a prescription medication for non-medical reasons? Never Filed at: 06/30/2020 0105                                MDM  Number of Diagnoses or Management Options  Nephrolithiasis:   UTI (urinary tract infection):   Diagnosis management comments: 47-year-old male presents with likely kidney stone  Will evaluate with a CT abdomen pelvis  Will get blood work  Will do urinalysis  Disposition  Final diagnoses:   UTI (urinary tract infection)   Nephrolithiasis     Time reflects when diagnosis was documented in both MDM as applicable and the Disposition within this note     Time User Action Codes Description Comment    6/30/2020  3:42 AM Prasanth Loud Add [N39 0] UTI (urinary tract infection)     6/30/2020  3:42 AM Prasanth Loud Add [N20 0] Nephrolithiasis       ED Disposition     ED Disposition Condition Date/Time Comment    Discharge Stable Tue Jun 30, 2020  3:42 AM Yandel Yi discharge to home/self care              Follow-up Information     Follow up With Specialties Details Why Contact Info Additional Puma Pearl DO Family Medicine Call in 1 day follow up being seen in the emergency department [address] [facility] Emergency Medicine Go to  As needed, If symptoms worsen [address] [phone]  [phone] [place] [facility], [place] [address], [place], [place], [address] [facility] Call today follow up being seen in the emergency department [address] [phone]  [address]  [facility], [address] [place] [address], [facility]          Discharge Medication List as of 6/30/2020  3:47 AM      START taking these medications    Details   cephalexin (KEFLEX) 500 mg capsule Take 1 capsule (500 mg total) by mouth 4 (four) times a day for 7 days, Starting Tue 6/30/2020, Until Tue 7/7/2020, Normal      !! ondansetron (ZOFRAN-ODT) 4 mg disintegrating tablet Take 1 tablet (4 mg total) by mouth every 6 (six) hours as needed for nausea or vomiting, Starting Tue 6/30/2020, Normal      oxyCODONE (ROXICODONE) 5 mg immediate release tablet Take 1 tablet (5 mg total) by mouth every 4 (four) hours as needed for moderate pain for up to 10 dosesMax Daily Amount: 30 mg, Starting Tue 6/30/2020, Normal      !! tamsulosin (FLOMAX) 0 4 mg Take 1 capsule (0 4 mg total) by mouth daily with dinner for 7 days, Starting Tue 6/30/2020, Until Tue 7/7/2020, Normal       !! - Potential duplicate medications found  Please discuss with provider        CONTINUE these medications which have NOT CHANGED    Details   ibuprofen (MOTRIN) 600 mg tablet Take 1 tablet (600 mg total) by mouth every 6 (six) hours as needed for mild pain or moderate pain, Starting Fri 3/27/2020, Normal      pantoprazole (PROTONIX) 40 mg tablet Take 40 mg by mouth daily, Historical Med      !! tamsulosin (FLOMAX) 0 4 mg Take 1 capsule (0 4 mg total) by mouth daily with dinner for 7 days, Starting Wed 6/24/2020, Until Wed 7/1/2020, Normal      acetaminophen (TYLENOL) 500 mg tablet Take 1,000 mg by mouth every 6 (six) hours as needed for mild pain or moderate pain, Historical Med      !! ondansetron (ZOFRAN-ODT) 4 mg disintegrating tablet Take 1 tablet (4 mg total) by mouth every 8 (eight) hours as needed for nausea or vomiting for up to 20 doses, Starting Wed 6/24/2020, Normal       !! - Potential duplicate medications found  Please discuss with provider  No discharge procedures on file      PDMP Review     None          ED Provider  Electronically Signed by           Codi Booth MD  06/30/20 2558

## 2020-07-02 ENCOUNTER — TELEPHONE (OUTPATIENT)
Dept: UROLOGY | Facility: MEDICAL CENTER | Age: 40
End: 2020-07-02

## 2020-07-02 NOTE — TELEPHONE ENCOUNTER
6/24 - 3mm left UVJ stone  6/30 stone has passed into the bladder  UA with some bacteria was d/c on keflex   OK to followup in 2ish weeks

## 2020-07-02 NOTE — TELEPHONE ENCOUNTER
Please Triage -   New Patient-     What is the reason for the patients appointment? ER follow up  Kidney Welford Drown and UTI  [employer]       Do we accept the patient's insurance or is the patient Self-Pay? Provider:  Vanessa MOORE  Plan:   Member ID#: pt does not have member id as yet - new card  Group#:  Subscriber:   Phone#:  Location:      Has the patient had any previous urologist(s)? no       Have patient records been requested? Care everywhere       Has the patient had any outside testing done? What is the patients location preference for an office visit? [place]      Does the patient have a personal history of cancer? no      (If no cancer hx   ) Is the patient ok with seeing Advanced Practitioner?       Patient can be reached at : [phone]

## 2020-07-07 NOTE — TELEPHONE ENCOUNTER
2nd attempt to contact pt   Called and left message on Vm to return call for FU appt Cardiac Catheterization Appropriate Use    [facility] Clinical Frailty Scale     [] 1. Very Fit  [] 2. Well  [x] 3. Managing Well  [] 4. Vulnerable  [] 5. Mildly Frail  [] 6. Moderately Frail [] 7. Severely Frail  [] 8. Very Severely Frail  [] 9. Terminally III        Heart Failure  [x]  Yes or []  No     If Yes,  Newly Diagnosed? []  Yes or [x]  No     If Yes, Heart Failure Type? [x]  Diastolic  []  Systolic  [] Unknown         If Yes, NYHA Class   NYHA Class III: Significant HF symptoms/activity intolerance (Only able to do light housework, can walk slowly on level ground)       Electrocardiac   Assessment  Method    [x] ECG   [] Telemetry Monitor [] Holter Monitor  [] Other   [] None     If any methods. Results   [x]Normal  [] Abnormal [] Uninterpretable         If Abnormal, New Antiarrhythmic  Therapy Initiated Prior to Cath Lab   []Yes  []No    If Abnormal,Electrocardiographic   Abnormality Type (select all that apply)      [] VFib   [] Sustained VT  [] Non-sustained VT  [] Exercised Induced VT  [] T wave Inversions    [] ST deviation >=0.5mm [] New LBBB   [] New Onset Afib  [] New Onset Aflutter  [] PVC- frequent  [] PVC- infrequent   [] 2nd Degree AV Block Type 1  [] 2nd Degree AV Block Type 2  [] 3rd Degree AV Block  [] Symptomatic Bradyarrhythmia  [] Other Electrocardiograph Abnormalities         If new onset Afib,     Heart Rate  ______bpm    If Non-sustained VT      (select all that apply) [] Symptomatic [] Newly Diagnosed  [] Other        Stress Test Peformed  []  Yes  []  No         If yes, specify test performed and must include risk of ischemia    StressTest Type Test Result Risk of Ischemia   [] Standard Exercise ST             (without imaging)  [] Stress Echo  [] ST Nuclear   [] ST with CMR    [] Positive                   []Negative  [] Indeterminate  [] Unavailable [] High   [] Intermediate  [] Low  [] Unavailable        [] Cardiac CTA (only pick one)     Indication(s) for Cath Lab Visit  [] 3VD   []  2VD   [] 1VD   [] No Disease       (select all that apply)   [] Indeterminant      [] ACS<=24 hours    [] ACS>24 hours  []New onset angina<=2 months  [] Worsening Angina  [] Resuscitated Cardiac Arrest   [] Stable Known CAD  [] Suspected CAD  [x] Valvular Disease  [] Pericardial Disease  [] Pre-Operative Evaluation  []  Syncope []Post-Cardiac Transplant  []Cardiac Arrhythmias   [] Cardiomyopathy  [] LV dysfunction  [] Evaluation for Exercise Clearance  [] Other         Chest Pain Symptoms     [] Typical Angina      []   Atypical                                Angina      [x] Non-anginal Chest Pain []Asymptomatic      Cardiovascular Instability  [] Yes  [x]  No     If yes, specify instability type (select all that apply)    [] Persistent Ischemic                     Symptoms(chest pain)   [] Hemodynamic Instability(not        Cardiogenic shock)  [] Cardiogenic Shock  [] Refractory Cardiogenic Shock   [] Acute Heart Failure Symptoms  [] Ventricular Arrhythmia        Evaluation for Surgery []  Cardiac Surgery        []  Non-Cardiac Surgery      Functional Capacity []<4 METS  []>= 4 METS without symptoms  []  >=4 mets with symptoms    []  Unknown     Surgical Risk []  Low     []  Intermediate    []High Risk Vascular  []  High Risk Non-Vascular     Solid Organ Transplant Surgery   []  No  []  Yes                  If yes, Donor   []  No  []  Yes                   If yes, Organ  []  Heart  []  Kidney    []  Liver  []  Lung   []  Pancreas  []  Other Organ

## 2020-07-08 NOTE — TELEPHONE ENCOUNTER
Mailed letter to patient, notifying we are trying to contact pt to schedule a FU appt with Toñito Gomes

## 2024-01-31 ENCOUNTER — HOSPITAL ENCOUNTER (EMERGENCY)
Facility: HOSPITAL | Age: 44
Discharge: HOME | End: 2024-01-31
Attending: EMERGENCY MEDICINE
Payer: COMMERCIAL

## 2024-01-31 ENCOUNTER — APPOINTMENT (EMERGENCY)
Dept: RADIOLOGY | Facility: HOSPITAL | Age: 44
End: 2024-01-31
Payer: COMMERCIAL

## 2024-01-31 VITALS
RESPIRATION RATE: 18 BRPM | TEMPERATURE: 97.7 F | HEIGHT: 70 IN | BODY MASS INDEX: 40.17 KG/M2 | HEART RATE: 67 BPM | DIASTOLIC BLOOD PRESSURE: 81 MMHG | SYSTOLIC BLOOD PRESSURE: 126 MMHG | OXYGEN SATURATION: 96 %

## 2024-01-31 DIAGNOSIS — R07.9 CHEST PAIN, UNSPECIFIED: Primary | ICD-10-CM

## 2024-01-31 DIAGNOSIS — R94.31 ABNORMAL EKG: ICD-10-CM

## 2024-01-31 DIAGNOSIS — M79.89 LEG SWELLING: ICD-10-CM

## 2024-01-31 DIAGNOSIS — R74.01 ELEVATED LIVER TRANSAMINASE LEVEL: ICD-10-CM

## 2024-01-31 LAB
2HR DELTA HS TROPONIN: 0 NG/L
ALBUMIN SERPL BCP-MCNC: 4.8 G/DL (ref 3.5–5)
ALP SERPL-CCNC: 78 U/L (ref 34–104)
ALT SERPL W P-5'-P-CCNC: 85 U/L (ref 7–52)
ANION GAP SERPL CALCULATED.3IONS-SCNC: 8 MMOL/L
APTT PPP: 26 SECONDS (ref 23–37)
AST SERPL W P-5'-P-CCNC: 44 U/L (ref 13–39)
BASOPHILS # BLD AUTO: 0.05 THOUSANDS/ÂΜL (ref 0–0.1)
BASOPHILS NFR BLD AUTO: 1 % (ref 0–1)
BILIRUB SERPL-MCNC: 0.47 MG/DL (ref 0.2–1)
BNP SERPL-MCNC: 11 PG/ML (ref 0–100)
BUN SERPL-MCNC: 22 MG/DL (ref 5–25)
CALCIUM SERPL-MCNC: 10.2 MG/DL (ref 8.4–10.2)
CARDIAC TROPONIN I PNL SERPL HS: 10 NG/L
CARDIAC TROPONIN I PNL SERPL HS: 10 NG/L
CHLORIDE SERPL-SCNC: 101 MMOL/L (ref 96–108)
CO2 SERPL-SCNC: 29 MMOL/L (ref 21–32)
CREAT SERPL-MCNC: 1.1 MG/DL (ref 0.6–1.3)
D DIMER PPP FEU-MCNC: 0.37 UG/ML FEU
EOSINOPHIL # BLD AUTO: 0.27 THOUSAND/ÂΜL (ref 0–0.61)
EOSINOPHIL NFR BLD AUTO: 3 % (ref 0–6)
ERYTHROCYTE [DISTWIDTH] IN BLOOD BY AUTOMATED COUNT: 12.6 % (ref 11.6–15.1)
GFR SERPL CREATININE-BSD FRML MDRD: 80 ML/MIN/1.73SQ M
GLUCOSE SERPL-MCNC: 108 MG/DL (ref 65–140)
HCT VFR BLD AUTO: 46.6 % (ref 36.5–49.3)
HGB BLD-MCNC: 16 G/DL (ref 12–17)
IMM GRANULOCYTES # BLD AUTO: 0.08 THOUSAND/UL (ref 0–0.2)
IMM GRANULOCYTES NFR BLD AUTO: 1 % (ref 0–2)
INR PPP: 0.98 (ref 0.84–1.19)
LYMPHOCYTES # BLD AUTO: 2.78 THOUSANDS/ÂΜL (ref 0.6–4.47)
LYMPHOCYTES NFR BLD AUTO: 27 % (ref 14–44)
MAGNESIUM SERPL-MCNC: 2.2 MG/DL (ref 1.9–2.7)
MCH RBC QN AUTO: 32.3 PG (ref 26.8–34.3)
MCHC RBC AUTO-ENTMCNC: 34.3 G/DL (ref 31.4–37.4)
MCV RBC AUTO: 94 FL (ref 82–98)
MONOCYTES # BLD AUTO: 0.95 THOUSAND/ÂΜL (ref 0.17–1.22)
MONOCYTES NFR BLD AUTO: 9 % (ref 4–12)
NEUTROPHILS # BLD AUTO: 6.03 THOUSANDS/ÂΜL (ref 1.85–7.62)
NEUTS SEG NFR BLD AUTO: 59 % (ref 43–75)
NRBC BLD AUTO-RTO: 0 /100 WBCS
PLATELET # BLD AUTO: 240 THOUSANDS/UL (ref 149–390)
PMV BLD AUTO: 10.6 FL (ref 8.9–12.7)
POTASSIUM SERPL-SCNC: 3.6 MMOL/L (ref 3.5–5.3)
PROT SERPL-MCNC: 7.3 G/DL (ref 6.4–8.4)
PROTHROMBIN TIME: 12.9 SECONDS (ref 11.6–14.5)
RBC # BLD AUTO: 4.95 MILLION/UL (ref 3.88–5.62)
SODIUM SERPL-SCNC: 138 MMOL/L (ref 135–147)
TSH SERPL DL<=0.05 MIU/L-ACNC: 3.52 UIU/ML (ref 0.45–4.5)
WBC # BLD AUTO: 10.16 THOUSAND/UL (ref 4.31–10.16)

## 2024-01-31 PROCEDURE — 93005 ELECTROCARDIOGRAM TRACING: CPT

## 2024-01-31 PROCEDURE — 84484 ASSAY OF TROPONIN QUANT: CPT | Performed by: EMERGENCY MEDICINE

## 2024-01-31 PROCEDURE — 99285 EMERGENCY DEPT VISIT HI MDM: CPT

## 2024-01-31 PROCEDURE — 85610 PROTHROMBIN TIME: CPT | Performed by: EMERGENCY MEDICINE

## 2024-01-31 PROCEDURE — 83735 ASSAY OF MAGNESIUM: CPT | Performed by: EMERGENCY MEDICINE

## 2024-01-31 PROCEDURE — 85025 COMPLETE CBC W/AUTO DIFF WBC: CPT | Performed by: EMERGENCY MEDICINE

## 2024-01-31 PROCEDURE — 85730 THROMBOPLASTIN TIME PARTIAL: CPT | Performed by: EMERGENCY MEDICINE

## 2024-01-31 PROCEDURE — 83880 ASSAY OF NATRIURETIC PEPTIDE: CPT | Performed by: EMERGENCY MEDICINE

## 2024-01-31 PROCEDURE — 36415 COLL VENOUS BLD VENIPUNCTURE: CPT | Performed by: EMERGENCY MEDICINE

## 2024-01-31 PROCEDURE — 99285 EMERGENCY DEPT VISIT HI MDM: CPT | Performed by: EMERGENCY MEDICINE

## 2024-01-31 PROCEDURE — 80053 COMPREHEN METABOLIC PANEL: CPT | Performed by: EMERGENCY MEDICINE

## 2024-01-31 PROCEDURE — 71045 X-RAY EXAM CHEST 1 VIEW: CPT

## 2024-01-31 PROCEDURE — 85379 FIBRIN DEGRADATION QUANT: CPT | Performed by: EMERGENCY MEDICINE

## 2024-01-31 PROCEDURE — 84443 ASSAY THYROID STIM HORMONE: CPT | Performed by: EMERGENCY MEDICINE

## 2024-01-31 RX ORDER — FUROSEMIDE 20 MG/1
20 TABLET ORAL DAILY
COMMUNITY

## 2024-01-31 RX ORDER — AMLODIPINE BESYLATE 5 MG/1
5 TABLET ORAL DAILY
COMMUNITY

## 2024-01-31 RX ORDER — LOSARTAN POTASSIUM 25 MG/1
25 TABLET ORAL DAILY
COMMUNITY

## 2024-02-01 NOTE — ED PROVIDER NOTES
"History  Chief Complaint   Patient presents with    Chest Pain     Patient states he started with \"fluttering\" in his chest around noon today with shortness of breath. Also states he was started on lasix Tuesday for fluid retention and feels the leg swelling is getting worse.        Chest Pain  Associated symptoms: palpitations    Associated symptoms: no abdominal pain, no back pain, no cough, no diaphoresis, no fever, no nausea, no numbness, no shortness of breath and not vomiting    Is a 43-year-old male presents the emergency department for evaluation of fluttering discomfort in his chest and also sensation of bilateral leg swelling.  Patient reports a past medical history significant for hypertension he reports a 2-week history of sensation of leg swelling which he reports is up-and-down and in the remote past he was on hydrochlorothiazide for a time for management of this but had since been discontinued.  He denies any significant weight gain.  He denies shortness of breath.  He reportedly saw his doctor who attributed the swelling to possibly being related to amlodipine use and he is weaning off of this medicine and starting losartan and is currently on both.  He reports that around noon today began with a palpitation like sensation in his middle, left chest which was associated with some mild shortness of breath and has since resolved.  He denied diaphoresis nausea vomiting lightheadedness dizziness change in color associated with this.  He mentioned the symptoms to his family who advised to come to the ER to be evaluated.  He does admit that he may have stressed himself out with these concerns.  He reports his leg swelling symptoms are currently not bad.  Denies any other medical problems.  Reports years ago he did undergo a stress test and reports that he had an abnormally positive stress test which led to a negative catheterization and he was told that he had a false positive stress test as a " result.    Prior to Admission Medications   Prescriptions Last Dose Informant Patient Reported? Taking?   acetaminophen (TYLENOL) 500 mg tablet  Self Yes No   Sig: Take 1,000 mg by mouth every 6 (six) hours as needed for mild pain or moderate pain   amLODIPine (NORVASC) 5 mg tablet 1/31/2024  Yes Yes   Sig: Take 5 mg by mouth daily   furosemide (LASIX) 20 mg tablet 1/31/2024  Yes Yes   Sig: Take 20 mg by mouth daily   ibuprofen (MOTRIN) 600 mg tablet   No No   Sig: Take 1 tablet (600 mg total) by mouth every 6 (six) hours as needed for mild pain or moderate pain   losartan (COZAAR) 25 mg tablet 1/31/2024  Yes Yes   Sig: Take 25 mg by mouth daily   ondansetron (ZOFRAN-ODT) 4 mg disintegrating tablet   No No   Sig: Take 1 tablet (4 mg total) by mouth every 8 (eight) hours as needed for nausea or vomiting for up to 20 doses   Patient not taking: Reported on 6/30/2020   ondansetron (ZOFRAN-ODT) 4 mg disintegrating tablet   No No   Sig: Take 1 tablet (4 mg total) by mouth every 6 (six) hours as needed for nausea or vomiting   oxyCODONE (ROXICODONE) 5 mg immediate release tablet   No No   Sig: Take 1 tablet (5 mg total) by mouth every 4 (four) hours as needed for moderate pain for up to 10 dosesMax Daily Amount: 30 mg   pantoprazole (PROTONIX) 40 mg tablet 1/31/2024  Yes Yes   Sig: Take 40 mg by mouth daily   tamsulosin (FLOMAX) 0.4 mg   No No   Sig: Take 1 capsule (0.4 mg total) by mouth daily with dinner for 7 days      Facility-Administered Medications: None       Past Medical History:   Diagnosis Date    Cholelithiases     False positive stress test     Pt states his PCP had him do a stress test. Pt had a false +. Had a clean cardiac cath.    GERD (gastroesophageal reflux disease)     Pt takes Protonix.    Loss of teeth due to extraction     Pneumonia     1/20       Past Surgical History:   Procedure Laterality Date    CARDIAC SURGERY      cardiac cath done after abnormal stress test. Clean cath.    FOREIGN BODY  REMOVAL Right     Pt had glass removed from 5th finger    ME LAPAROSCOPY SURG CHOLECYSTECTOMY N/A 3/27/2020    Procedure: LAPAROSCOPIC CHOLECYSTECTOMY;  Surgeon: Debi Robb DO;  Location:  MAIN OR;  Service: General    WISDOM TOOTH EXTRACTION         History reviewed. No pertinent family history.  I have reviewed and agree with the history as documented.    E-Cigarette/Vaping    E-Cigarette Use Never User      E-Cigarette/Vaping Substances     Social History     Tobacco Use    Smoking status: Every Day     Current packs/day: 2.00     Average packs/day: 2.0 packs/day for 20.0 years (40.0 ttl pk-yrs)     Types: Cigarettes    Smokeless tobacco: Never    Tobacco comments:     Not ready to quit   Vaping Use    Vaping status: Never Used   Substance Use Topics    Alcohol use: Yes    Drug use: Not Currently       Review of Systems   Constitutional:  Negative for activity change, chills, diaphoresis and fever.   HENT:  Negative for ear pain and sore throat.    Eyes:  Negative for pain and visual disturbance.   Respiratory:  Negative for cough and shortness of breath.    Cardiovascular:  Positive for chest pain, palpitations and leg swelling.   Gastrointestinal:  Negative for abdominal pain, nausea and vomiting.   Genitourinary:  Negative for dysuria, flank pain and hematuria.   Musculoskeletal:  Negative for arthralgias, back pain and neck pain.   Skin:  Negative for color change and rash.   Neurological:  Negative for seizures, syncope and numbness.   All other systems reviewed and are negative.      Physical Exam  Physical Exam  Vitals and nursing note reviewed.   Constitutional:       General: He is not in acute distress.     Appearance: He is well-developed. He is obese. He is not ill-appearing, toxic-appearing or diaphoretic.   HENT:      Head: Normocephalic and atraumatic.   Neck:      Vascular: No JVD.      Trachea: No tracheal deviation.   Cardiovascular:      Rate and Rhythm: Normal rate and regular  rhythm.      Pulses:           Radial pulses are 2+ on the right side and 2+ on the left side.      Heart sounds: Normal heart sounds. No murmur heard.  Pulmonary:      Effort: Pulmonary effort is normal.      Breath sounds: Normal breath sounds. No decreased breath sounds, wheezing, rhonchi or rales.   Chest:      Chest wall: No tenderness.   Musculoskeletal:      Right lower leg: No tenderness. No edema.      Left lower leg: No tenderness. No edema.   Skin:     General: Skin is warm and dry.      Capillary Refill: Capillary refill takes less than 2 seconds.      Coloration: Skin is not cyanotic.   Neurological:      General: No focal deficit present.      Mental Status: He is alert.         Vital Signs  ED Triage Vitals   Temperature Pulse Respirations Blood Pressure SpO2   01/31/24 1831 01/31/24 1744 01/31/24 1744 01/31/24 1744 01/31/24 1744   97.7 °F (36.5 °C) 75 20 148/86 95 %      Temp src Heart Rate Source Patient Position - Orthostatic VS BP Location FiO2 (%)   -- 01/31/24 1744 -- -- --    Monitor         Pain Score       01/31/24 1744       3           Vitals:    01/31/24 1744 01/31/24 1831 01/31/24 1901 01/31/24 2001   BP: 148/86 136/72 129/62 126/81   Pulse: 75 69 67 67         Visual Acuity      ED Medications  Medications - No data to display    Diagnostic Studies  Results Reviewed       Procedure Component Value Units Date/Time    HS Troponin I 2hr [[id number]]  (Normal) Collected: 01/31/24 1933    Lab Status: Final result Specimen: Blood from Arm, Left Updated: 01/31/24 2002     hs TnI 2hr 10 ng/L      Delta 2hr hsTnI 0 ng/L     HS Troponin I 4hr [[id number]]     Lab Status: No result Specimen: Blood     TSH, 3rd generation with Free T4 reflex [[id number]]  (Normal) Collected: 01/31/24 1748    Lab Status: Final result Specimen: Blood from Arm, Left Updated: 01/31/24 1827     TSH 3RD GENERATON 3.523 uIU/mL     HS Troponin 0hr (reflex protocol) [[id number]]  (Normal) Collected: 01/31/24 1748    Lab Status:  Final result Specimen: Blood from Arm, Left Updated: 01/31/24 1818     hs TnI 0hr 10 ng/L     Protime-INR [[id number]]  (Normal) Collected: 01/31/24 1748    Lab Status: Final result Specimen: Blood from Arm, Left Updated: 01/31/24 1816     Protime 12.9 seconds      INR 0.98    APTT [[id number]]  (Normal) Collected: 01/31/24 1748    Lab Status: Final result Specimen: Blood from Arm, Left Updated: 01/31/24 1816     PTT 26 seconds     B-Type Natriuretic Peptide(BNP) [[id number]]  (Normal) Collected: 01/31/24 1748    Lab Status: Final result Specimen: Blood from Arm, Left Updated: 01/31/24 1816     BNP 11 pg/mL     Comprehensive metabolic panel [[id number]]  (Abnormal) Collected: 01/31/24 1748    Lab Status: Final result Specimen: Blood from Arm, Left Updated: 01/31/24 1812     Sodium 138 mmol/L      Potassium 3.6 mmol/L      Chloride 101 mmol/L      CO2 29 mmol/L      ANION GAP 8 mmol/L      BUN 22 mg/dL      Creatinine 1.10 mg/dL      Glucose 108 mg/dL      Calcium 10.2 mg/dL      AST 44 U/L      ALT 85 U/L      Alkaline Phosphatase 78 U/L      Total Protein 7.3 g/dL      Albumin 4.8 g/dL      Total Bilirubin 0.47 mg/dL      eGFR 80 ml/min/1.73sq m     Narrative:      National Kid[employer] guidelines for Chronic Kidney Disease (CKD):     Stage 1 with normal or high GFR (GFR > 90 mL/min/1.73 square meters)    Stage 2 Mild CKD (GFR = 60-89 mL/min/1.73 square meters)    Stage 3A Moderate CKD (GFR = 45-59 mL/min/1.73 square meters)    Stage 3B Moderate CKD (GFR = 30-44 mL/min/1.73 square meters)    Stage 4 Severe CKD (GFR = 15-29 mL/min/1.73 square meters)    Stage 5 End Stage CKD (GFR <15 mL/min/1.73 square meters)  Note: GFR calculation is accurate only with a steady state creatinine    Magnesium [[id number]]  (Normal) Collected: 01/31/24 1748    Lab Status: Final result Specimen: Blood from Arm, Left Updated: 01/31/24 1812     Magnesium 2.2 mg/dL     D-Dimer [[id number]]  (Normal) Collected: 01/31/24 1748    Lab  Status: Final result Specimen: Blood from Arm, Left Updated: 01/31/24 1808     D-Dimer, Quant 0.37 ug/ml FEU     CBC and differential [[id number]] Collected: 01/31/24 1748    Lab Status: Final result Specimen: Blood from Arm, Left Updated: 01/31/24 1756     WBC 10.16 Thousand/uL      RBC 4.95 Million/uL      Hemoglobin 16.0 g/dL      Hematocrit 46.6 %      MCV 94 fL      MCH 32.3 pg      MCHC 34.3 g/dL      RDW 12.6 %      MPV 10.6 fL      Platelets 240 Thousands/uL      nRBC 0 /100 WBCs      Neutrophils Relative 59 %      Immat GRANS % 1 %      Lymphocytes Relative 27 %      Monocytes Relative 9 %      Eosinophils Relative 3 %      Basophils Relative 1 %      Neutrophils Absolute 6.03 Thousands/µL      Immature Grans Absolute 0.08 Thousand/uL      Lymphocytes Absolute 2.78 Thousands/µL      Monocytes Absolute 0.95 Thousand/µL      Eosinophils Absolute 0.27 Thousand/µL      Basophils Absolute 0.05 Thousands/µL                    XR chest 1 view portable   ED Interpretation by Kristopher Amato DO (01/31 1806)   1 view x-ray of the chest interpreted by myself pending official radiology report.  Mediastinum is unchanged from prior chest x-ray on file.  No large pleural effusion.  Questionable slight pulmonary edema.  No pneumonia.                 Procedures  Procedures         ED Course  ED Course as of 01/31/24 2044 Wed Jan 31, 2024 1806 EKG interpreted by myself.  EKG dated 1/31/2024 at 1745 demonstrates normal sinus rhythm at 74 bpm, normal DE, QRS, QTc interval, nonspecific T wave abnormality, no STEMI.  T wave inversions are noted in leads V3, V4, V5, V6, leads II, III, aVF.   1810 D-Dimer, Quant: 0.37   1846 TSH 3RD GENERATON: 3.523   1954 Repeat EKG interpreted by myself.  EKG dated 1/31/2024 1934 demonstrates normal sinus rhythm at 63 bpm, normal DE duration, normal QRS duration, normal QTc duration, persistent T wave abnormality of unclear etiology with biphasic T waves in leads II, III, aVF, and T  wave inversions in leads V3 through V6.  No STEMI.  Patient remains chest pain-free.   1955 No acute ischemic changes when compared to prior EKG on arrival.   2025 hs TnI 2hr: 10   2025 Delta 2hr hsTnI: 0             HEART Risk Score      Flowsheet Row Most Recent Value   Heart Score Risk Calculator    History 1 Filed at: 01/31/2024 1921   ECG 1 Filed at: 01/31/2024 1921   Age 0 Filed at: 01/31/2024 1921   Risk Factors 1 Filed at: 01/31/2024 1921   Troponin 0 Filed at: 01/31/2024 1921   HEART Score 3 Filed at: 01/31/2024 1921                          SBIRT 22yo+      Flowsheet Row Most Recent Value   Initial Alcohol Screen: US AUDIT-C     1. How often do you have a drink containing alcohol? 0 Filed at: 01/31/2024 1744   2. How many drinks containing alcohol do you have on a typical day you are drinking?  0 Filed at: 01/31/2024 1744   3a. Male UNDER 65: How often do you have five or more drinks on one occasion? 0 Filed at: 01/31/2024 1744   3b. FEMALE Any Age, or MALE 65+: How often do you have 4 or more drinks on one occassion? 0 Filed at: 01/31/2024 1744   Audit-C Score 0 Filed at: 01/31/2024 1744   TRU: How many times in the past year have you...    Used an illegal drug or used a prescription medication for non-medical reasons? Never Filed at: 01/31/2024 1744                      Medical Decision Making  43-year-old male with reported history of cath negative abnormal stress test, hypertension, with subjective complaint of leg swelling intermittently over 2 weeks bilaterally as well as new onset palpitations and left-sided chest comfort beginning at noon today and resolved prior to arrival.  He presents here with normal vital signs.  His lungs are clear.  No cardiac murmurs on exam.  EKG reveals T wave inversions in V3 through V6 as well as biphasic versus inverted T waves in the inferior leads with no prior for comparison.  I did review prior [facility] notes there was a documented EKG which I cannot review  with visual comparison that does not comment on T waves at all and it is unclear if these T wave inversions were present at that time.  The patient throughout the ED course he remained chest pain-free.  His initial troponin was 10 and on 2-hour repeat it was also 10 and he had no further symptoms.  I discussed the EKG abnormality with the patient and the need for follow-up with a cardiologist and possible stress testing, echo or further evaluation I also discussed the minimally elevated but newly elevated AST and ALT of unclear etiology and significance.  Clinically the patient does not appear to have significant leg swelling there is certainly no pitting edema.  Given possibility of VTE contributing to his symptoms a D-dimer was obtained and it is negative.  His BNP is also negative.  I discussed dependent edema as a possible cause of his symptoms or other noncardiac nonthrombosis etiology of his symptoms.  Patient was discharged with unremarkable cardiac workup other than the abnormal EKG with return to ED precautions and referral to cardiology and instructions for contacting the office as well as his PCP.  He was agreeable to that plan.    Problems Addressed:  Abnormal EKG: acute illness or injury  Chest pain, unspecified: acute illness or injury  Elevated liver transaminase level: undiagnosed new problem with uncertain prognosis  Leg swelling: chronic illness or injury    Amount and/or Complexity of Data Reviewed  Labs: ordered. Decision-making details documented in ED Course.  Radiology: ordered and independent interpretation performed.             Disposition  Final diagnoses:   Chest pain, unspecified   Abnormal EKG - TWI V3-V6, biphasic T-waves II, III, aVF   Elevated liver transaminase level - minimally elevated AST 85 ALT 44, new from prior 10mo ago   Leg swelling     Time reflects when diagnosis was documented in both MDM as applicable and the Disposition within this note       Time User Action Codes  Description Comment    1/31/2024  8:25 PM Kristopher Amato [R07.9] Chest pain, unspecified     1/31/2024  8:25 PM Kristopher Amato Add [R94.31] Abnormal EKG     1/31/2024  8:26 PM Kristopher Amato Add [R74.01] Elevated liver transaminase level     1/31/2024  8:27 PM Kristopher Amato Modify [R74.01] Elevated liver transaminase level minimally elevated     1/31/2024  8:32 PM Kristopher Amato Modify [R74.01] Elevated liver transaminase level minimally elevated AST 85 ALT 44, new from prior 10mo ago    1/31/2024  8:33 PM Kristopher Amato Modify [R94.31] Abnormal EKG TWI V3-V6, biphasic T-waves II, III, aVF    1/31/2024  8:36 PM Kristopher Amato [M79.89] Leg swelling           ED Disposition       ED Disposition   Discharge    Condition   Stable    Date/Time   Wed Jan 31, 2024 2025    Comment   Subhash Jennings discharge to home/self care.                   Follow-up Information       Follow up With Specialties Details Why Contact Info Additional Information    Reuben Lewis,  Family Medicine Schedule an appointment as soon as possible for a visit  for follow up on elevated liver enzymes and abnormal  [address] [address] [phone]       [facility] Cardiology Schedule an appointment as soon as possible for a visit  for evaluation of abnormal EKG [address] [place] [address] [phone] [facility], [address] [place] [address], [address] [phone]    [facility] Emergency Department Emergency Medicine Go to  If symptoms worsen [address] [facility] [address] [phone] [facility] Emergency Department, [address] [place], [place], [address]            Patient's Medications   Discharge Prescriptions    No medications on file           PDMP Review       None            ED  Provider  Electronically Signed by             Kristopher Amato,   01/31/24 8178

## 2024-02-01 NOTE — DISCHARGE INSTRUCTIONS
Thank you for visiting the Emergency Department today.    Evaluate for chest discomfort and leg swelling.  There is no evidence of heart failure, blood clots or abnormal vital signs.  Your EKG was noted to have some subtle abnormalities but it is unclear if these are chronic or new.  Fortunately, your heart enzymes were stable during the workup and there is no evidence of active heart damage as the cause of your symptoms.  Recommend follow-up with your PCP and your cardiologist.  If you do not regularly see a cardiologist you should follow-up with the [facility] cardiology office listed above call for an appointment a referral is ordered.  Return if any symptoms of chest pain shortness of breath or other concerns.  Your liver enzymes were also noted to be slightly elevated.  This is likely not related to your current symptoms and on review of old labs from almost a year ago they appear to be newly elevated it is unclear if this is of any significance or not it would be reasonable to follow-up with your primary doctor to discuss these results they may want to repeat blood work in the future to compare and see if this is changing or worsening or improving.

## 2024-02-02 LAB
ATRIAL RATE: 63 BPM
ATRIAL RATE: 74 BPM
P AXIS: 32 DEGREES
P AXIS: 37 DEGREES
PR INTERVAL: 144 MS
PR INTERVAL: 148 MS
QRS AXIS: 54 DEGREES
QRS AXIS: 55 DEGREES
QRSD INTERVAL: 82 MS
QRSD INTERVAL: 84 MS
QT INTERVAL: 342 MS
QT INTERVAL: 374 MS
QTC INTERVAL: 379 MS
QTC INTERVAL: 382 MS
T WAVE AXIS: -20 DEGREES
T WAVE AXIS: 7 DEGREES
VENTRICULAR RATE: 63 BPM
VENTRICULAR RATE: 74 BPM

## 2024-12-31 ENCOUNTER — HOSPITAL ENCOUNTER (OUTPATIENT)
Facility: HOSPITAL | Age: 44
Setting detail: OBSERVATION
Discharge: LEFT BEFORE DISCHARGE | End: 2025-01-01
Attending: EMERGENCY MEDICINE | Admitting: FAMILY MEDICINE
Payer: COMMERCIAL

## 2024-12-31 ENCOUNTER — APPOINTMENT (EMERGENCY)
Dept: RADIOLOGY | Facility: HOSPITAL | Age: 44
End: 2024-12-31
Payer: COMMERCIAL

## 2024-12-31 DIAGNOSIS — I44.7 NEW ONSET LEFT BUNDLE BRANCH BLOCK (LBBB): ICD-10-CM

## 2024-12-31 DIAGNOSIS — R07.9 CHEST PAIN: Primary | ICD-10-CM

## 2024-12-31 PROBLEM — E78.49 OTHER HYPERLIPIDEMIA: Status: ACTIVE | Noted: 2024-12-31

## 2024-12-31 PROBLEM — R79.89 ELEVATED LFTS: Status: ACTIVE | Noted: 2024-12-31

## 2024-12-31 PROBLEM — R73.09 ELEVATED GLUCOSE: Status: ACTIVE | Noted: 2024-12-31

## 2024-12-31 PROBLEM — Z72.0 TOBACCO ABUSE: Status: ACTIVE | Noted: 2024-12-31

## 2024-12-31 PROBLEM — R07.89 OTHER CHEST PAIN: Status: ACTIVE | Noted: 2024-12-31

## 2024-12-31 PROBLEM — I10 PRIMARY HYPERTENSION: Status: ACTIVE | Noted: 2024-12-31

## 2024-12-31 LAB
2HR DELTA HS TROPONIN: -1 NG/L
4HR DELTA HS TROPONIN: 1 NG/L
ALBUMIN SERPL BCG-MCNC: 4.3 G/DL (ref 3.5–5)
ALP SERPL-CCNC: 90 U/L (ref 34–104)
ALT SERPL W P-5'-P-CCNC: 77 U/L (ref 7–52)
ANION GAP SERPL CALCULATED.3IONS-SCNC: 10 MMOL/L (ref 4–13)
AST SERPL W P-5'-P-CCNC: 49 U/L (ref 13–39)
ATRIAL RATE: 66 BPM
ATRIAL RATE: 67 BPM
ATRIAL RATE: 73 BPM
BASOPHILS # BLD AUTO: 0.04 THOUSANDS/ΜL (ref 0–0.1)
BASOPHILS NFR BLD AUTO: 1 % (ref 0–1)
BILIRUB SERPL-MCNC: 0.54 MG/DL (ref 0.2–1)
BNP SERPL-MCNC: 23 PG/ML (ref 0–100)
BUN SERPL-MCNC: 11 MG/DL (ref 5–25)
CALCIUM SERPL-MCNC: 10.9 MG/DL (ref 8.4–10.2)
CARDIAC TROPONIN I PNL SERPL HS: 11 NG/L (ref ?–50)
CARDIAC TROPONIN I PNL SERPL HS: 12 NG/L (ref ?–50)
CARDIAC TROPONIN I PNL SERPL HS: 13 NG/L (ref ?–50)
CHLORIDE SERPL-SCNC: 101 MMOL/L (ref 96–108)
CO2 SERPL-SCNC: 25 MMOL/L (ref 21–32)
CREAT SERPL-MCNC: 0.79 MG/DL (ref 0.6–1.3)
EOSINOPHIL # BLD AUTO: 0.21 THOUSAND/ΜL (ref 0–0.61)
EOSINOPHIL NFR BLD AUTO: 3 % (ref 0–6)
ERYTHROCYTE [DISTWIDTH] IN BLOOD BY AUTOMATED COUNT: 12.7 % (ref 11.6–15.1)
GFR SERPL CREATININE-BSD FRML MDRD: 107 ML/MIN/1.73SQ M
GLUCOSE SERPL-MCNC: 204 MG/DL (ref 65–140)
HCT VFR BLD AUTO: 47.1 % (ref 36.5–49.3)
HGB BLD-MCNC: 16.4 G/DL (ref 12–17)
IMM GRANULOCYTES # BLD AUTO: 0.05 THOUSAND/UL (ref 0–0.2)
IMM GRANULOCYTES NFR BLD AUTO: 1 % (ref 0–2)
LYMPHOCYTES # BLD AUTO: 2.24 THOUSANDS/ΜL (ref 0.6–4.47)
LYMPHOCYTES NFR BLD AUTO: 27 % (ref 14–44)
MCH RBC QN AUTO: 32.7 PG (ref 26.8–34.3)
MCHC RBC AUTO-ENTMCNC: 34.8 G/DL (ref 31.4–37.4)
MCV RBC AUTO: 94 FL (ref 82–98)
MONOCYTES # BLD AUTO: 0.81 THOUSAND/ΜL (ref 0.17–1.22)
MONOCYTES NFR BLD AUTO: 10 % (ref 4–12)
NEUTROPHILS # BLD AUTO: 4.93 THOUSANDS/ΜL (ref 1.85–7.62)
NEUTS SEG NFR BLD AUTO: 58 % (ref 43–75)
NRBC BLD AUTO-RTO: 0 /100 WBCS
P AXIS: 43 DEGREES
P AXIS: 43 DEGREES
P AXIS: 44 DEGREES
PLATELET # BLD AUTO: 215 THOUSANDS/UL (ref 149–390)
PMV BLD AUTO: 11.4 FL (ref 8.9–12.7)
POTASSIUM SERPL-SCNC: 3.8 MMOL/L (ref 3.5–5.3)
PR INTERVAL: 148 MS
PR INTERVAL: 156 MS
PR INTERVAL: 166 MS
PROT SERPL-MCNC: 6.6 G/DL (ref 6.4–8.4)
QRS AXIS: -29 DEGREES
QRS AXIS: -39 DEGREES
QRS AXIS: 57 DEGREES
QRSD INTERVAL: 144 MS
QRSD INTERVAL: 150 MS
QRSD INTERVAL: 76 MS
QT INTERVAL: 368 MS
QT INTERVAL: 404 MS
QT INTERVAL: 420 MS
QTC INTERVAL: 385 MS
QTC INTERVAL: 443 MS
QTC INTERVAL: 445 MS
RBC # BLD AUTO: 5.01 MILLION/UL (ref 3.88–5.62)
SODIUM SERPL-SCNC: 136 MMOL/L (ref 135–147)
T WAVE AXIS: -58 DEGREES
T WAVE AXIS: 110 DEGREES
T WAVE AXIS: 120 DEGREES
VENTRICULAR RATE: 66 BPM
VENTRICULAR RATE: 67 BPM
VENTRICULAR RATE: 73 BPM
WBC # BLD AUTO: 8.28 THOUSAND/UL (ref 4.31–10.16)

## 2024-12-31 PROCEDURE — 99223 1ST HOSP IP/OBS HIGH 75: CPT | Performed by: FAMILY MEDICINE

## 2024-12-31 PROCEDURE — 71046 X-RAY EXAM CHEST 2 VIEWS: CPT

## 2024-12-31 PROCEDURE — 99285 EMERGENCY DEPT VISIT HI MDM: CPT | Performed by: EMERGENCY MEDICINE

## 2024-12-31 PROCEDURE — 80053 COMPREHEN METABOLIC PANEL: CPT | Performed by: EMERGENCY MEDICINE

## 2024-12-31 PROCEDURE — 85025 COMPLETE CBC W/AUTO DIFF WBC: CPT | Performed by: EMERGENCY MEDICINE

## 2024-12-31 PROCEDURE — 36415 COLL VENOUS BLD VENIPUNCTURE: CPT | Performed by: EMERGENCY MEDICINE

## 2024-12-31 PROCEDURE — 93005 ELECTROCARDIOGRAM TRACING: CPT

## 2024-12-31 PROCEDURE — 83880 ASSAY OF NATRIURETIC PEPTIDE: CPT | Performed by: FAMILY MEDICINE

## 2024-12-31 PROCEDURE — 99285 EMERGENCY DEPT VISIT HI MDM: CPT

## 2024-12-31 PROCEDURE — 84484 ASSAY OF TROPONIN QUANT: CPT | Performed by: EMERGENCY MEDICINE

## 2024-12-31 RX ORDER — LOSARTAN POTASSIUM 50 MG/1
50 TABLET ORAL 2 TIMES DAILY
Status: DISCONTINUED | OUTPATIENT
Start: 2024-12-31 | End: 2025-01-01 | Stop reason: HOSPADM

## 2024-12-31 RX ORDER — ASPIRIN 81 MG/1
81 TABLET, CHEWABLE ORAL DAILY
Status: DISCONTINUED | OUTPATIENT
Start: 2024-12-31 | End: 2025-01-01 | Stop reason: HOSPADM

## 2024-12-31 RX ORDER — INSULIN LISPRO 100 [IU]/ML
2-12 INJECTION, SOLUTION INTRAVENOUS; SUBCUTANEOUS
Status: DISCONTINUED | OUTPATIENT
Start: 2025-01-01 | End: 2025-01-01 | Stop reason: HOSPADM

## 2024-12-31 RX ORDER — PANTOPRAZOLE SODIUM 40 MG/1
40 TABLET, DELAYED RELEASE ORAL
Status: DISCONTINUED | OUTPATIENT
Start: 2024-12-31 | End: 2025-01-01 | Stop reason: HOSPADM

## 2024-12-31 RX ORDER — MORPHINE SULFATE 4 MG/ML
4 INJECTION, SOLUTION INTRAMUSCULAR; INTRAVENOUS EVERY 4 HOURS PRN
Status: DISCONTINUED | OUTPATIENT
Start: 2024-12-31 | End: 2025-01-01 | Stop reason: HOSPADM

## 2024-12-31 RX ORDER — ACETAMINOPHEN 325 MG/1
650 TABLET ORAL EVERY 6 HOURS PRN
Status: DISCONTINUED | OUTPATIENT
Start: 2024-12-31 | End: 2025-01-01 | Stop reason: HOSPADM

## 2024-12-31 RX ORDER — ATORVASTATIN CALCIUM 10 MG/1
20 TABLET, FILM COATED ORAL
Status: DISCONTINUED | OUTPATIENT
Start: 2024-12-31 | End: 2025-01-01 | Stop reason: HOSPADM

## 2024-12-31 RX ORDER — ENOXAPARIN SODIUM 100 MG/ML
40 INJECTION SUBCUTANEOUS EVERY 12 HOURS
Status: DISCONTINUED | OUTPATIENT
Start: 2024-12-31 | End: 2025-01-01 | Stop reason: HOSPADM

## 2024-12-31 RX ORDER — NICOTINE 21 MG/24HR
1 PATCH, TRANSDERMAL 24 HOURS TRANSDERMAL DAILY
Status: DISCONTINUED | OUTPATIENT
Start: 2025-01-01 | End: 2025-01-01 | Stop reason: HOSPADM

## 2024-12-31 RX ADMIN — LOSARTAN POTASSIUM 50 MG: 50 TABLET, FILM COATED ORAL at 22:49

## 2024-12-31 RX ADMIN — PANTOPRAZOLE SODIUM 40 MG: 40 TABLET, DELAYED RELEASE ORAL at 22:01

## 2024-12-31 RX ADMIN — ENOXAPARIN SODIUM 40 MG: 40 INJECTION SUBCUTANEOUS at 22:49

## 2024-12-31 RX ADMIN — ATORVASTATIN CALCIUM 20 MG: 10 TABLET, FILM COATED ORAL at 22:01

## 2024-12-31 RX ADMIN — ASPIRIN 81 MG: 81 TABLET, CHEWABLE ORAL at 22:01

## 2024-12-31 NOTE — ED PROVIDER NOTES
Time reflects when diagnosis was documented in both MDM as applicable and the Disposition within this note       Time User Action Codes Description Comment    12/31/2024  8:57 PM Chasity Lennon Add [R07.9] Chest pain     12/31/2024  8:57 PM Chasity Lennon Add [I44.7] New onset left bundle branch block (LBBB)           ED Disposition       ED Disposition   Admit    Condition   Stable    Date/Time   Tue Dec 31, 2024  8:57 PM    Comment   Case was discussed with JOSEP and the patient's admission status was agreed to be Admission Status: observation status to the service of Dr. Hay.               Assessment & Plan       Medical Decision Making  Amount and/or Complexity of Data Reviewed  Labs: ordered.  Radiology: ordered.    Risk  Decision regarding hospitalization.      44-year-old male with history of hypertension presenting for evaluation of chest pain since yesterday.   No other associated symptoms, no worsening of pain with exertion but does admit that he has had some dyspnea on exertion for the past month.  Will check EKG and troponin to evaluate for ACS or arrhythmia, CBC to evaluate for anemia, CMP to evaluate for metabolic abnormality, chest x-ray to evaluate for cardiomegaly or pneumonia or pneumothorax.  Reviewed labs, no marked abnormalities.  Initial troponin negative.  Reviewed and interpreted EKG, shows new left bundle branch block compared to prior EKG a year ago, while in the emergency department, his EKG changed to normal conduction but diffuse T wave inversions.  This looks similar to his EKG from a year ago.  He continues to have dynamic EKG changes with intermittent left bundle branch block.  I did discuss with on-call cardiology, they recommend admission for telemetry, echo in the morning and cardiology consult.  Patient is agreeable for admission.  Discussed with medicine for admission.             Medications - No data to display    ED Risk Strat Scores                          SBIRT 20yo+       Flowsheet Row Most Recent Value   Initial Alcohol Screen: US AUDIT-C     3a. Male UNDER 65: How often do you have five or more drinks on one occasion? 0 Filed at: 12/31/2024 1755   Audit-C Score 0 Filed at: 12/31/2024 1755   TRU: How many times in the past year have you...    Used an illegal drug or used a prescription medication for non-medical reasons? Never Filed at: 12/31/2024 1755                            History of Present Illness       Chief Complaint   Patient presents with    Chest Pain     Chest pain started yesterday afternoon now going into neck and jaw area with numbness       Past Medical History:   Diagnosis Date    Cholelithiases     False positive stress test     Pt states his PCP had him do a stress test. Pt had a false +. Had a clean cardiac cath.    GERD (gastroesophageal reflux disease)     Pt takes Protonix.    HLD (hyperlipidemia)     Hypertension     Loss of teeth due to extraction     Pneumonia     1/20      Past Surgical History:   Procedure Laterality Date    CARDIAC SURGERY      cardiac cath done after abnormal stress test. Clean cath.    FOREIGN BODY REMOVAL Right     Pt had glass removed from 5th finger    [place] LAPAROSCOPY SURG CHOLECYSTECTOMY N/A 3/27/2020    Procedure: LAPAROSCOPIC CHOLECYSTECTOMY;  Surgeon: Debi Robb DO;  Location:  MAIN OR;  Service: General    WISDOM TOOTH EXTRACTION        Family History   Problem Relation Age of Onset    Coronary artery disease Father       Social History     Tobacco Use    Smoking status: Every Day     Current packs/day: 2.00     Average packs/day: 2.0 packs/day for 20.0 years (40.0 ttl pk-yrs)     Types: Cigarettes    Smokeless tobacco: Never    Tobacco comments:     Not ready to quit   Vaping Use    Vaping status: Never Used   Substance Use Topics    Alcohol use: Yes    Drug use: Not Currently      E-Cigarette/Vaping    E-Cigarette Use Never User       E-Cigarette/Vaping Substances      I have reviewed and agree with the  history as documented.     HPI    44-year-old male with history of hypertension presenting for evaluation of chest pain.  Patient states he has been having intermittent left-sided chest pain for the past 2 days.  He states pain yesterday started when he was sitting down.  He states he had pain today when he was walking around at work.  He states pain is currently 3 out of 10 in severity.  He states pain feels like a dull ache in the left side of his chest radiating into the left arm and up into the jaw. Denies any worsening of chest pain with exertion. He states he has had some increasing shortness of breath with exertion over the past month.  He otherwise denies any associated diaphoresis, nausea or vomiting.  Denies lightheadedness or dizziness.  Denies recent fevers, chills, or cough.  Patient was seen in the emergency department a year ago for similar pain but was not sure what was causing the pain.  He states he does have family history of coronary artery disease.  He does smoke 2 packs/day of cigarettes.    Review of Systems   Constitutional:  Negative for appetite change, chills and fever.   HENT:  Negative for congestion, rhinorrhea and sore throat.    Respiratory:  Positive for shortness of breath. Negative for cough.    Cardiovascular:  Positive for chest pain.   Gastrointestinal:  Negative for abdominal pain, diarrhea, nausea and vomiting.   Musculoskeletal:  Negative for arthralgias and myalgias.   Skin:  Negative for rash.   Neurological:  Negative for dizziness, weakness, light-headedness, numbness and headaches.   All other systems reviewed and are negative.          Objective       ED Triage Vitals   Temperature Pulse Blood Pressure Respirations SpO2 Patient Position - Orthostatic VS   12/31/24 1753 12/31/24 1753 12/31/24 1753 12/31/24 1753 12/31/24 1753 12/31/24 1753   97.6 °F (36.4 °C) 76 (!) 188/89 20 94 % Sitting      Temp Source Heart Rate Source BP Location FiO2 (%) Pain Score    12/31/24 1753  12/31/24 1800 12/31/24 1753 -- 12/31/24 1753    Temporal Monitor Left arm  5      Vitals      Date and Time Temp Pulse SpO2 Resp BP Pain Score FACES Pain Rating User   01/01/25 0646 98.2 °F (36.8 °C) 64 97 % 16 139/83 -- -- DII   01/01/25 0217 98.2 °F (36.8 °C) 70 95 % 16 138/83 -- -- DII   12/31/24 2124 -- -- -- -- -- No Pain --    12/31/24 2119 98.3 °F (36.8 °C) 70 96 % 17 153/97 -- -- DII   12/31/24 2100 98.2 °F (36.8 °C) 70 96 % -- 156/82 No Pain --    12/31/24 2030 98.7 °F (37.1 °C) 72 95 % 15 162/84 No Pain --    12/31/24 2000 -- 66 93 % 20 150/81 -- --    12/31/24 1952 -- -- 98 % -- -- -- --    12/31/24 1930 98.7 °F (37.1 °C) 65 91 % 24 137/76 No Pain --    12/31/24 1912 -- -- 96 % -- -- -- --    12/31/24 1902 98.7 °F (37.1 °C) 68 96 % 18 139/81 2 -- NK   12/31/24 1800 98.2 °F (36.8 °C) 73 95 % 18 188/89 4 --    12/31/24 1753 97.6 °F (36.4 °C) 76 94 % 20 188/89 5 -- KTR            Physical Exam  Vitals and nursing note reviewed.   Constitutional:       General: He is not in acute distress.     Appearance: Normal appearance. He is well-developed. He is obese. He is not ill-appearing, toxic-appearing or diaphoretic.   HENT:      Head: Normocephalic and atraumatic.      Right Ear: External ear normal.      Left Ear: External ear normal.      Nose: Nose normal.      Mouth/Throat:      Mouth: Mucous membranes are moist.      Pharynx: Oropharynx is clear.   Eyes:      Extraocular Movements: Extraocular movements intact.      Conjunctiva/sclera: Conjunctivae normal.   Cardiovascular:      Rate and Rhythm: Normal rate and regular rhythm.      Pulses: Normal pulses.      Heart sounds: Normal heart sounds. No murmur heard.     No friction rub. No gallop.   Pulmonary:      Effort: Pulmonary effort is normal. No respiratory distress.      Breath sounds: Normal breath sounds. No decreased breath sounds, wheezing, rhonchi or rales.   Abdominal:      General: There is no distension.      Palpations: Abdomen  is soft.      Tenderness: There is no abdominal tenderness. There is no guarding or rebound.   Musculoskeletal:         General: No tenderness.      Cervical back: Neck supple.      Right lower leg: No tenderness. No edema.      Left lower leg: No tenderness. No edema.   Skin:     General: Skin is warm and dry.      Coloration: Skin is not pale.      Findings: No rash.   Neurological:      General: No focal deficit present.      Mental Status: He is alert and oriented to person, place, and time.      Cranial Nerves: No cranial nerve deficit.      Sensory: No sensory deficit.      Motor: No weakness.   Psychiatric:         Mood and Affect: Mood normal.         Behavior: Behavior normal.         Results Reviewed       Procedure Component Value Units Date/Time    HS Troponin I 4hr [[id number]]  (Normal) Collected: 12/31/24 2246    Lab Status: Final result Specimen: Blood from Arm, Right Updated: 12/31/24 2318     hs TnI 4hr 13 ng/L      Delta 4hr hsTnI 1 ng/L     B-Type Natriuretic Peptide(BNP) [[id number]]  (Normal) Collected: 12/31/24 1819    Lab Status: Final result Specimen: Blood from Arm, Left Updated: 12/31/24 2207     BNP 23 pg/mL     HS Troponin I 2hr [[id number]]  (Normal) Collected: 12/31/24 2033    Lab Status: Final result Specimen: Blood from Arm, Left Updated: 12/31/24 2149     hs TnI 2hr 11 ng/L      Delta 2hr hsTnI -1 ng/L     Comprehensive metabolic panel [[id number]]  (Abnormal) Collected: 12/31/24 1819    Lab Status: Final result Specimen: Blood from Arm, Left Updated: 12/31/24 1913     Sodium 136 mmol/L      Potassium 3.8 mmol/L      Chloride 101 mmol/L      CO2 25 mmol/L      ANION GAP 10 mmol/L      BUN 11 mg/dL      Creatinine 0.79 mg/dL      Glucose 204 mg/dL      Calcium 10.9 mg/dL      AST 49 U/L      ALT 77 U/L      Alkaline Phosphatase 90 U/L      Total Protein 6.6 g/dL      Albumin 4.3 g/dL      Total Bilirubin 0.54 mg/dL      eGFR 107 ml/min/1.73sq m     Narrative:      National Kidney Disease  Foundation guidelines for Chronic Kidney Disease (CKD):     Stage 1 with normal or high GFR (GFR > 90 mL/min/1.73 square meters)    Stage 2 Mild CKD (GFR = 60-89 mL/min/1.73 square meters)    Stage 3A Moderate CKD (GFR = 45-59 mL/min/1.73 square meters)    Stage 3B Moderate CKD (GFR = 30-44 mL/min/1.73 square meters)    Stage 4 Severe CKD (GFR = 15-29 mL/min/1.73 square meters)    Stage 5 End Stage CKD (GFR <15 mL/min/1.73 square meters)  Note: GFR calculation is accurate only with a steady state creatinine    HS Troponin 0hr (reflex protocol) [[id number]]  (Normal) Collected: 12/31/24 1819    Lab Status: Final result Specimen: Blood from Arm, Left Updated: 12/31/24 1848     hs TnI 0hr 12 ng/L     CBC and differential [[id number]] Collected: 12/31/24 1819    Lab Status: Final result Specimen: Blood from Arm, Left Updated: 12/31/24 1825     WBC 8.28 Thousand/uL      RBC 5.01 Million/uL      Hemoglobin 16.4 g/dL      Hematocrit 47.1 %      MCV 94 fL      MCH 32.7 pg      MCHC 34.8 g/dL      RDW 12.7 %      MPV 11.4 fL      Platelets 215 Thousands/uL      nRBC 0 /100 WBCs      Segmented % 58 %      Immature Grans % 1 %      Lymphocytes % 27 %      Monocytes % 10 %      Eosinophils Relative 3 %      Basophils Relative 1 %      Absolute Neutrophils 4.93 Thousands/µL      Absolute Immature Grans 0.05 Thousand/uL      Absolute Lymphocytes 2.24 Thousands/µL      Absolute Monocytes 0.81 Thousand/µL      Eosinophils Absolute 0.21 Thousand/µL      Basophils Absolute 0.04 Thousands/µL             XR chest 2 views   Final Interpretation by Radha Vela MD (01/01 1113)      No acute cardiopulmonary disease.               Workstation performed: [id number]             ECG 12 Lead Documentation Only    Date/Time: 12/31/2024 8:07 PM    Performed by: Chasity Lennon MD  Authorized by: Chasity Lennon MD    Indications / Diagnosis:  Chest pain  ECG reviewed by me, the ED Provider: yes    Patient location:  ED  Previous ECG:      Previous ECG:  Compared to current    Similarity:  No change    Comparison to cardiac monitor: Yes    Interpretation:     Interpretation: non-specific    Rate:     ECG rate:  67    ECG rate assessment: normal    Rhythm:     Rhythm: sinus rhythm    Ectopy:     Ectopy: none    QRS:     QRS axis:  Left    QRS intervals:  Wide  Conduction:     Conduction: abnormal      Abnormal conduction: complete LBBB    ST segments:     ST segments:  Normal  T waves:     T waves: non-specific    ECG 12 Lead Documentation Only    Date/Time: 12/31/2024 8:08 PM    Performed by: Chasity Lennon MD  Authorized by: Chasity Lennon MD    Indications / Diagnosis:  Chest pain  ECG reviewed by me, the ED Provider: yes    Patient location:  ED  Previous ECG:     Previous ECG:  Compared to current    Similarity:  Changes noted    Comparison to cardiac monitor: Yes    Interpretation:     Interpretation: non-specific    Rate:     ECG rate:  66    ECG rate assessment: normal    Rhythm:     Rhythm: sinus rhythm    Ectopy:     Ectopy: none    QRS:     QRS axis:  Normal    QRS intervals:  Normal  Conduction:     Conduction: normal    ST segments:     ST segments:  Normal  T waves:     T waves: inverted      Inverted:  II, III, aVF, V2, V3, V4, V5 and V6      ED Medication and Procedure Management   Prior to Admission Medications   Prescriptions Last Dose Informant Patient Reported? Taking?   losartan (COZAAR) 25 mg tablet   Yes No   Sig: Take 50 mg by mouth 2 (two) times a day   pantoprazole (PROTONIX) 40 mg tablet Past Month  Yes Yes   Sig: Take 40 mg by mouth daily   tamsulosin (FLOMAX) 0.4 mg   No No   Sig: Take 1 capsule (0.4 mg total) by mouth daily with dinner for 7 days      Facility-Administered Medications: None     Discharge Medication List as of 1/1/2025  9:30 AM        START taking these medications    Details   aspirin 81 mg chewable tablet Chew 1 tablet (81 mg total) daily, Starting Thu 1/2/2025, Normal      atorvastatin (LIPITOR) 20  mg tablet Take 1 tablet (20 mg total) by mouth daily with dinner, Starting Wed 1/1/2025, Normal           CONTINUE these medications which have NOT CHANGED    Details   pantoprazole (PROTONIX) 40 mg tablet Take 40 mg by mouth daily, Historical Med      losartan (COZAAR) 25 mg tablet Take 50 mg by mouth 2 (two) times a day, Historical Med      tamsulosin (FLOMAX) 0.4 mg Take 1 capsule (0.4 mg total) by mouth daily with dinner for 7 days, Starting Tue 6/30/2020, Until Tue 7/7/2020, Normal           Outpatient Discharge Orders   Ambulatory referral to Cardiology   Standing Status: Future Standing Exp. Date: 01/01/26      Call provider for:  persistent nausea or vomiting     Call provider for:  severe uncontrolled pain     Call provider for:  redness, tenderness, or signs of infection (pain, swelling, redness, odor or green/yellow discharge around incision site)     Call provider for: active or persistent bleeding     Call provider for:  difficulty breathing, headache or visual disturbances     Call provider for:  persistent dizziness or light-headedness     Call provider for:  extreme fatigue     ED SEPSIS DOCUMENTATION   Time reflects when diagnosis was documented in both MDM as applicable and the Disposition within this note       Time User Action Codes Description Comment    12/31/2024  8:57 PM Chasity Lennon [R07.9] Chest pain     12/31/2024  8:57 PM Chasity Lennon [I44.7] New onset left bundle branch block (LBBB)                  Chasity Lennon MD  01/01/25 1870

## 2025-01-01 VITALS
RESPIRATION RATE: 16 BRPM | OXYGEN SATURATION: 97 % | HEIGHT: 70 IN | HEART RATE: 64 BPM | SYSTOLIC BLOOD PRESSURE: 139 MMHG | BODY MASS INDEX: 42.2 KG/M2 | DIASTOLIC BLOOD PRESSURE: 83 MMHG | WEIGHT: 294.75 LBS | TEMPERATURE: 98.2 F

## 2025-01-01 LAB
ALBUMIN SERPL BCG-MCNC: 4 G/DL (ref 3.5–5)
ALP SERPL-CCNC: 83 U/L (ref 34–104)
ALT SERPL W P-5'-P-CCNC: 75 U/L (ref 7–52)
ANION GAP SERPL CALCULATED.3IONS-SCNC: 9 MMOL/L (ref 4–13)
AST SERPL W P-5'-P-CCNC: 52 U/L (ref 13–39)
BILIRUB SERPL-MCNC: 0.61 MG/DL (ref 0.2–1)
BUN SERPL-MCNC: 10 MG/DL (ref 5–25)
CALCIUM SERPL-MCNC: 9.6 MG/DL (ref 8.4–10.2)
CHLORIDE SERPL-SCNC: 102 MMOL/L (ref 96–108)
CHOLEST SERPL-MCNC: 220 MG/DL (ref ?–200)
CO2 SERPL-SCNC: 27 MMOL/L (ref 21–32)
CREAT SERPL-MCNC: 0.78 MG/DL (ref 0.6–1.3)
ERYTHROCYTE [DISTWIDTH] IN BLOOD BY AUTOMATED COUNT: 12.5 % (ref 11.6–15.1)
EST. AVERAGE GLUCOSE BLD GHB EST-MCNC: 246 MG/DL
GFR SERPL CREATININE-BSD FRML MDRD: 108 ML/MIN/1.73SQ M
GLUCOSE P FAST SERPL-MCNC: 192 MG/DL (ref 65–99)
GLUCOSE SERPL-MCNC: 192 MG/DL (ref 65–140)
GLUCOSE SERPL-MCNC: 220 MG/DL (ref 65–140)
HBA1C MFR BLD: 10.2 %
HCT VFR BLD AUTO: 46.9 % (ref 36.5–49.3)
HDLC SERPL-MCNC: 27 MG/DL
HGB BLD-MCNC: 16.2 G/DL (ref 12–17)
LDLC SERPL CALC-MCNC: 143 MG/DL (ref 0–100)
MCH RBC QN AUTO: 32.7 PG (ref 26.8–34.3)
MCHC RBC AUTO-ENTMCNC: 34.5 G/DL (ref 31.4–37.4)
MCV RBC AUTO: 95 FL (ref 82–98)
NONHDLC SERPL-MCNC: 193 MG/DL
PLATELET # BLD AUTO: 194 THOUSANDS/UL (ref 149–390)
PMV BLD AUTO: 10.9 FL (ref 8.9–12.7)
POTASSIUM SERPL-SCNC: 3.8 MMOL/L (ref 3.5–5.3)
PROT SERPL-MCNC: 6.1 G/DL (ref 6.4–8.4)
RBC # BLD AUTO: 4.95 MILLION/UL (ref 3.88–5.62)
SODIUM SERPL-SCNC: 138 MMOL/L (ref 135–147)
TRIGL SERPL-MCNC: 251 MG/DL (ref ?–150)
WBC # BLD AUTO: 6.89 THOUSAND/UL (ref 4.31–10.16)

## 2025-01-01 PROCEDURE — 83036 HEMOGLOBIN GLYCOSYLATED A1C: CPT | Performed by: FAMILY MEDICINE

## 2025-01-01 PROCEDURE — 82948 REAGENT STRIP/BLOOD GLUCOSE: CPT

## 2025-01-01 PROCEDURE — 85027 COMPLETE CBC AUTOMATED: CPT | Performed by: FAMILY MEDICINE

## 2025-01-01 PROCEDURE — 99239 HOSP IP/OBS DSCHRG MGMT >30: CPT | Performed by: HOSPITALIST

## 2025-01-01 PROCEDURE — 80061 LIPID PANEL: CPT | Performed by: FAMILY MEDICINE

## 2025-01-01 PROCEDURE — 80053 COMPREHEN METABOLIC PANEL: CPT | Performed by: FAMILY MEDICINE

## 2025-01-01 RX ORDER — ATORVASTATIN CALCIUM 20 MG/1
20 TABLET, FILM COATED ORAL
Qty: 30 TABLET | Refills: 0 | Status: SHIPPED | OUTPATIENT
Start: 2025-01-01

## 2025-01-01 RX ORDER — ASPIRIN 81 MG/1
81 TABLET, CHEWABLE ORAL DAILY
Qty: 30 TABLET | Refills: 0 | Status: SHIPPED | OUTPATIENT
Start: 2025-01-02

## 2025-01-01 RX ADMIN — ASPIRIN 81 MG: 81 TABLET, CHEWABLE ORAL at 08:23

## 2025-01-01 RX ADMIN — LOSARTAN POTASSIUM 50 MG: 50 TABLET, FILM COATED ORAL at 08:23

## 2025-01-01 NOTE — DISCHARGE INSTR - AVS FIRST PAGE
- Please follow-up with your primary care provider within 1 week  -Please follow-up with cardiology  -You had elevated blood sugars, and a pending hemoglobin A1c which is part of a workup for possible diabetes, which based on your blood glucoses you probably have.  Please follow-up your primary care provider for more appropriate management  -You were started on aspirin and a statin for presumed coronary artery disease given your chest pain and discomfort.  Please follow-up with your primary care provider and cardiology as stated above

## 2025-01-01 NOTE — PLAN OF CARE
Problem: PAIN - ADULT  Goal: Verbalizes/displays adequate comfort level or baseline comfort level  Description: Interventions:  - Encourage patient to monitor pain and request assistance  - Assess pain using appropriate pain scale  - Administer analgesics based on type and severity of pain and evaluate response  - Implement non-pharmacological measures as appropriate and evaluate response  - Consider cultural and social influences on pain and pain management  - Notify physician/advanced practitioner if interventions unsuccessful or patient reports new pain  Outcome: Progressing     Problem: INFECTION - ADULT  Goal: Absence or prevention of progression during hospitalization  Description: INTERVENTIONS:  - Assess and monitor for signs and symptoms of infection  - Monitor lab/diagnostic results  - Monitor all insertion sites, i.e. indwelling lines, tubes, and drains  - Monitor endotracheal if appropriate and nasal secretions for changes in amount and color  - [place] appropriate cooling/warming therapies per order  - Administer medications as ordered  - Instruct and encourage patient and family to use good hand hygiene technique  - Identify and instruct in appropriate isolation precautions for identified infection/condition  Outcome: Progressing     Problem: SAFETY ADULT  Goal: Patient will remain free of falls  Description: INTERVENTIONS:  - Educate patient/family on patient safety including physical limitations  - Instruct patient to call for assistance with activity   - Consult OT/PT to assist with strengthening/mobility   - Keep Call bell within reach  - Keep bed low and locked with side rails adjusted as appropriate  - Keep care items and personal belongings within reach  - Initiate and maintain comfort rounds  - Make Fall Risk Sign visible to staff  - Offer Toileting every two Hours, in advance of need   Obtain necessary fall risk management equipment: non slip socks   - Apply yellow socks and bracelet for  high fall risk patients  - Consider moving patient to room near nurses station  Outcome: Progressing  Goal: Maintain or return to baseline ADL function  Description: INTERVENTIONS:  -  Assess patient's ability to carry out ADLs; assess patient's baseline for ADL function and identify physical deficits which impact ability to perform ADLs (bathing, care of mouth/teeth, toileting, grooming, dressing, etc.)  - Assess/evaluate cause of self-care deficits   - Assess range of motion  - Assess patient's mobility; develop plan if impaired  - Assess patient's need for assistive devices and provide as appropriate  - Encourage maximum independence but intervene and supervise when necessary  - Involve family in performance of ADLs  - Assess for home care needs following discharge   - Consider OT consult to assist with ADL evaluation and planning for discharge  - Provide patient education as appropriate  Outcome: Progressing  Goal: Maintains/Returns to pre admission functional level  Description: INTERVENTIONS:  - Perform AM-PAC 6 Click Basic Mobility/ Daily Activity assessment daily.  - Set and communicate daily mobility goal to care team and patient/family/caregiver.   - Collaborate with rehabilitation services on mobility goals if consulted  - Perform Range of Motion three times a day.  - Dangle patient three times a day  - Stand patient three times a day  - Ambulate patient three times a day  - Out of bed to chair three times a day   - Out of bed for meals three times a day  - Out of bed for toileting  - Record patient progress and toleration of activity level   Outcome: Progressing     Problem: DISCHARGE PLANNING  Goal: Discharge to home or other facility with appropriate resources  Description: INTERVENTIONS:  - Identify barriers to discharge w/patient and caregiver  - Arrange for needed discharge resources and transportation as appropriate  - Identify discharge learning needs (meds, wound care, etc.)  - Arrange for  interpretive services to assist at discharge as needed  - Refer to Case Management Department for coordinating discharge planning if the patient needs post-hospital services based on physician/advanced practitioner order or complex needs related to functional status, cognitive ability, or social support system  Outcome: Progressing     Problem: Knowledge Deficit  Goal: Patient/family/caregiver demonstrates understanding of disease process, treatment plan, medications, and discharge instructions  Description: Complete learning assessment and assess knowledge base.  Interventions:  - Provide teaching at level of understanding  - Provide teaching via preferred learning methods  Outcome: Progressing     Problem: CARDIOVASCULAR - ADULT  Goal: Maintains optimal cardiac output and hemodynamic stability  Description: INTERVENTIONS:  - Monitor I/O, vital signs and rhythm  - Monitor for S/S and trends of decreased cardiac output  - Administer and titrate ordered vasoactive medications to optimize hemodynamic stability  - Assess quality of pulses, skin color and temperature  - Assess for signs of decreased coronary artery perfusion  - Instruct patient to report change in severity of symptoms  Outcome: Progressing  Goal: Absence of cardiac dysrhythmias or at baseline rhythm  Description: INTERVENTIONS:  - Continuous cardiac monitoring, vital signs, obtain 12 lead EKG if ordered  - Administer antiarrhythmic and heart rate control medications as ordered  - Monitor electrolytes and administer replacement therapy as ordered  Outcome: Progressing

## 2025-01-01 NOTE — ASSESSMENT & PLAN NOTE
Troponins were negative  Nothing significant noted on telemetry  Patient's chest pain has resolved  Was recommended for cardiology consult and echocardiogram  Unfortunately, patient left AGAINST MEDICAL ADVICE, recommended workup was discussed with him, and the risks of leaving, which include worsening of symptoms, and not excluding an death.  Specifically discussed with him, for potential worsening heart failure, worsening chest pain and possible heart attack.    Patient was comfortable following up as an outpatient, ambulatory referral to cardiology  Started patient on aspirin and Lipitor empirically

## 2025-01-01 NOTE — ASSESSMENT & PLAN NOTE
Back in April 2023, seen by [facility] cardiology and he was on Norvasc 10 mg daily and hydrochlorothiazide 25 mg daily  He now is currently on losartan 50 mg twice daily.  Follow-up with his primary care provider for better control of his hypertension

## 2025-01-01 NOTE — DISCHARGE SUMMARY
Discharge Summary - Hospitalist   Name: Subhash Jennings 44 y.o. male I MRN: [id number]  Unit/Bed#: 425-01 I Date of Admission: 12/31/2024   Date of Service: 1/1/2025 I Hospital Day: 0     Assessment & Plan  Other chest pain  Troponins were negative  Nothing significant noted on telemetry  Patient's chest pain has resolved  Was recommended for cardiology consult and echocardiogram  Unfortunately, patient left AGAINST MEDICAL ADVICE, recommended workup was discussed with him, and the risks of leaving, which include worsening of symptoms, and not excluding an death.  Specifically discussed with him, for potential worsening heart failure, worsening chest pain and possible heart attack.    Patient was comfortable following up as an outpatient, ambulatory referral to cardiology  Started patient on aspirin and Lipitor empirically  Primary hypertension  Back in April 2023, seen by [facility] cardiology and he was on Norvasc 10 mg daily and hydrochlorothiazide 25 mg daily  He now is currently on losartan 50 mg twice daily.  Follow-up with his primary care provider for better control of his hypertension  Other hyperlipidemia  Lipid panel shows mixed hyperlipidemia, started on Lipitor  Tobacco abuse  Smokes 2 packs/day, will start him on nicotine patch  Elevated glucose  Glucose 204, will do insulin sliding scale and order A1c for the morning  Hemoglobin A1c is pending, given to persistent glucoses, including a fasting glucose which was notably elevated, the patient likely has diabetes  Unfortunate left AMA  Follow-up with his primary care provider  Elevated LFTs  Mildly elevated, which it has been.  Appears stable     Medical Problems      Discharging Physician / Practitioner: Baudilio Valentine DO  PCP: Reuben Lewis DO  Admission Date:   Admission Orders (From admission, onward)       Ordered        12/31/24 2058  Place in Observation  Once                          Discharge Date: 01/01/25    Consultations During Hospital  "Stay:  None    Procedures Performed:   None    Significant Findings / Test Results:   XR chest 2 views    (Results Pending)     Lab Results   Component Value Date    WBC 6.89 01/01/2025    HGB 16.2 01/01/2025    HCT 46.9 01/01/2025    MCV 95 01/01/2025     01/01/2025     Lab Results   Component Value Date    SODIUM 138 01/01/2025    K 3.8 01/01/2025     01/01/2025    CO2 27 01/01/2025    BUN 10 01/01/2025    CREATININE 0.78 01/01/2025    GLUC 192 (H) 01/01/2025    CALCIUM 9.6 01/01/2025         Incidental Findings:   Se above   I reviewed the above mentioned incidental findings with the patient and/or family and they expressed understanding.    Test Results Pending at Discharge (will require follow up):   HgA1c     Outpatient Tests Requested:  none    Complications:  none    Reason for Admission: CP    Hospital Course:   Subhash Jennings is a 44 y.o. male patient who originally presented to the hospital on 12/31/2024 due to chest pain.  He is also found to have a new left bundle branch block on EKG.  His troponins were trended and negative.  Repeat EKG did show resolution of the left bundle branch block.  Patient was admitted for further workup.  Patient did have resolution of his chest pain.  Unfortunately patient left AGAINST MEDICAL ADVICE.  Risks were discussed with him, including worsening of symptoms, possible MI, and not excluding death.  Patient was okay with outpatient follow-up, as above.           Please see above list of diagnoses and related plan for additional information.     Condition at Discharge: good    Discharge Day Visit / Exam:   Subjective: Patient is frustrated, states his chest pain has resolved.  Vitals: Blood Pressure: 139/83 (01/01/25 0646)  Pulse: 64 (01/01/25 0646)  Temperature: 98.2 °F (36.8 °C) (01/01/25 0646)  Temp Source: Temporal (12/31/24 2030)  Respirations: 16 (01/01/25 0646)  Height: 5' 10\" (177.8 cm) (12/31/24 2112)  Weight - Scale: 134 kg (294 lb 12.1 oz) " (12/31/24 2112)  SpO2: 97 % (01/01/25 0646)  Physical Exam  Vitals and nursing note reviewed.   Constitutional:       General: He is not in acute distress.     Appearance: He is well-developed. He is obese.   HENT:      Head: Normocephalic.   Eyes:      Conjunctiva/sclera: Conjunctivae normal.   Cardiovascular:      Rate and Rhythm: Normal rate and regular rhythm.      Heart sounds: No murmur heard.  Pulmonary:      Effort: Pulmonary effort is normal. No respiratory distress.      Breath sounds: Normal breath sounds.   Musculoskeletal:         General: No swelling.      Cervical back: Neck supple.   Skin:     General: Skin is warm and dry.      Capillary Refill: Capillary refill takes less than 2 seconds.   Neurological:      Mental Status: He is alert.          Discussion with Family: Patient declined call to .     Discharge instructions/Information to patient and family:   See after visit summary for information provided to patient and family.      Provisions for Follow-Up Care:  See after visit summary for information related to follow-up care and any pertinent home health orders.      Mobility at time of Discharge:   Basic Mobility Inpatient Raw Score: 24  JH-HLM Goal: 8: Walk 250 feet or more  JH-HLM Achieved: 8: Walk 250 feet ot more  HLM Goal achieved. Continue to encourage appropriate mobility.     Disposition:   Other: left AGAINST MEDICAL ADVICE    Planned Readmission: no    Discharge Medications:  See after visit summary for reconciled discharge medications provided to patient and/or family.      Administrative Statements   Discharge Statement:  I have spent a total time of 35 minutes in caring for this patient on the day of the visit/encounter. >30 minutes of time was spent on: Diagnostic results, Importance of tx compliance, Risk factor reductions, Impressions, Counseling / Coordination of care, and Documenting in the medical record.    **Please Note: This note may have been constructed  using a voice recognition system**

## 2025-01-01 NOTE — CASE MANAGEMENT
Case Management Discharge Planning Note    Patient name Subhash Jennings  Location /425-01 MRN [id number]  : 1980 Date 2025       Current Admission Date: 2024  Current Admission Diagnosis:Other chest pain   Patient Active Problem List    Diagnosis Date Noted Date Diagnosed    Other chest pain 2024     Tobacco abuse 2024     Primary hypertension 2024     Other hyperlipidemia 2024     Elevated glucose 2024     Elevated LFTs 2024     Symptomatic cholelithiasis        LOS (days): 0  Geometric Mean LOS (GMLOS) (days):   Days to GMLOS:     OBJECTIVE:            Current admission status: Observation   Preferred Pharmacy:   [id number] [address] [address]  Phone: [phone] Fax: [phone]    Primary Care Provider: Reuben Lewis DO    Primary Insurance: [employer]  Secondary Insurance:     DISCHARGE DETAILS:  Pt left AMA on this date.

## 2025-01-01 NOTE — ASSESSMENT & PLAN NOTE
Trend trop  Telemetry  Cardiology consult   Echo   Start aspirin 81 mg daily  Start Lipitor 20mg daily   Lipid and A1c in morning     Similar presentation back in March 31, 2023 with chest pain due to axillary hypertension.

## 2025-01-01 NOTE — H&P
H&P - Hospitalist   Name: Subhash Jennings 44 y.o. male I MRN: [id number]  Unit/Bed#: 425-01 I Date of Admission: 12/31/2024   Date of Service: 12/31/2024 I Hospital Day: 0     Assessment & Plan  Other chest pain  Trend trop  Telemetry  Cardiology consult   Echo   Start aspirin 81 mg daily  Start Lipitor 20mg daily   Lipid and A1c in morning     Similar presentation back in March 31, 2023 with chest pain due to axillary hypertension.  Primary hypertension  Back in April 2023, seen by [facility] cardiology and he was on Norvasc 10 mg daily and hydrochlorothiazide 25 mg daily  He now is currently on losartan 50 mg twice daily.  Other hyperlipidemia  Will order lipid panel, start Lipitor 10 mg daily.  In the past his lipid panel was abnormal.  Tobacco abuse  Smokes 2 packs/day, will start him on nicotine patch  Elevated glucose  Glucose 204, will do insulin sliding scale and order A1c for the morning  Elevated LFTs  Mildly elevated, which it has been.  Repeat cmp in the morning         Disposition  #1  Trend troponin  #2  Consult to cardiology  #3  Echocardiogram  #4  Labs in the morning including lipid panel and A1c        VTE Pharmacologic Prophylaxis: VTE Score: 1 Moderate Risk (Score 3-4) - Pharmacological DVT Prophylaxis Ordered: enoxaparin (Lovenox).  Code Status: Level 1 - Full Code   Discussion with family: Updated  (son) at bedside.    Anticipated Length of Stay: Patient will be admitted on an observation basis with an anticipated length of stay of less than 2 midnights secondary to chest pain .    History of Present Illness   Chief Complaint: Chest pain    Subhash Jennings is a 44 y.o. male with a PMH of hypertension, hyperlipidemia, acid reflux who presents with chest pain since yesterday and now going to the neck and jaw area with numbness.  Patient's chest pain is improved.  Patient is similar episode last year March 2023, with excellent hypertension and was started on Norvasc and  hydrochlorothiazide.  He was followed up by [facility] cardiology in April.  The patient now sees his PCP and his medicine was changed from Norvasc/hydrochlorothiazide to the losartan 50 mg twice daily.  He is no longer on a statin, or Protonix for his acid reflux.  He states his PCP took him off of it.    Review of Systems   HENT: Negative.     Respiratory:  Positive for shortness of breath.    Cardiovascular:  Positive for chest pain.   Gastrointestinal: Negative.    Genitourinary: Negative.    Musculoskeletal: Negative.    Neurological: Negative.        Historical Information   Past Medical History:   Diagnosis Date    Cholelithiases     False positive stress test     Pt states his PCP had him do a stress test. Pt had a false +. Had a clean cardiac cath.    GERD (gastroesophageal reflux disease)     Pt takes Protonix.    HLD (hyperlipidemia)     Hypertension     Loss of teeth due to extraction     Pneumonia     1/20     Past Surgical History:   Procedure Laterality Date    CARDIAC SURGERY      cardiac cath done after abnormal stress test. Clean cath.    FOREIGN BODY REMOVAL Right     Pt had glass removed from 5th finger    SC LAPAROSCOPY SURG CHOLECYSTECTOMY N/A 3/27/2020    Procedure: LAPAROSCOPIC CHOLECYSTECTOMY;  Surgeon: Debi Robb DO;  Location:  MAIN OR;  Service: General    WISDOM TOOTH EXTRACTION       Social History     Tobacco Use    Smoking status: Every Day     Current packs/day: 2.00     Average packs/day: 2.0 packs/day for 20.0 years (40.0 ttl pk-yrs)     Types: Cigarettes    Smokeless tobacco: Never    Tobacco comments:     Not ready to quit   Vaping Use    Vaping status: Never Used   Substance and Sexual Activity    Alcohol use: Yes    Drug use: Not Currently    Sexual activity: Not on file     E-Cigarette/Vaping    E-Cigarette Use Never User      E-Cigarette/Vaping Substances     Family history non-contributory  Social History:  Marital Status: /Civil Union   Occupation:    Patient Pre-hospital Living Situation: Home  Patient Pre-hospital Level of Mobility: walks  Patient Pre-hospital Diet Restrictions: none    Meds/Allergies   I have reviewed home medications with patient personally.  Prior to Admission medications    Medication Sig Start Date End Date Taking? Authorizing Provider   pantoprazole (PROTONIX) 40 mg tablet Take 40 mg by mouth daily   Yes Historical Provider, MD   losartan (COZAAR) 25 mg tablet Take 50 mg by mouth 2 (two) times a day    Historical Provider, MD   tamsulosin (FLOMAX) 0.4 mg Take 1 capsule (0.4 mg total) by mouth daily with dinner for 7 days 6/30/20 7/7/20  Neal Arnold MD   acetaminophen (TYLENOL) 500 mg tablet Take 1,000 mg by mouth every 6 (six) hours as needed for mild pain or moderate pain  12/31/24  Historical Provider, MD   amLODIPine (NORVASC) 5 mg tablet Take 5 mg by mouth daily  Patient not taking: Reported on 12/31/2024 12/31/24  Historical Provider, MD   furosemide (LASIX) 20 mg tablet Take 20 mg by mouth daily  Patient not taking: Reported on 12/31/2024 12/31/24  Historical Provider, MD   ibuprofen (MOTRIN) 600 mg tablet Take 1 tablet (600 mg total) by mouth every 6 (six) hours as needed for mild pain or moderate pain 3/27/20 12/31/24  Debi Robb DO   ondansetron (ZOFRAN-ODT) 4 mg disintegrating tablet Take 1 tablet (4 mg total) by mouth every 8 (eight) hours as needed for nausea or vomiting for up to 20 doses  Patient not taking: Reported on 6/30/2020 6/24/20 12/31/24  Sue Bartlett MD   ondansetron (ZOFRAN-ODT) 4 mg disintegrating tablet Take 1 tablet (4 mg total) by mouth every 6 (six) hours as needed for nausea or vomiting  Patient not taking: Reported on 12/31/2024 6/30/20 12/31/24  Neal Arnold MD   oxyCODONE (ROXICODONE) 5 mg immediate release tablet Take 1 tablet (5 mg total) by mouth every 4 (four) hours as needed for moderate pain for up to 10 dosesMax Daily Amount: 30 mg  Patient not taking: Reported  on 12/31/2024 6/30/20 12/31/24  Neal Arnold MD     No Known Allergies    Objective :  Temp:  [97.6 °F (36.4 °C)-98.7 °F (37.1 °C)] 98.3 °F (36.8 °C)  HR:  [65-76] 70  BP: (137-188)/(76-97) 153/97  Resp:  [15-24] 17  SpO2:  [91 %-98 %] 96 %  O2 Device: None (Room air)    Physical Exam  Constitutional:       Appearance: Normal appearance. He is obese.   HENT:      Head: Normocephalic and atraumatic.      Nose: Nose normal.      Mouth/Throat:      Mouth: Mucous membranes are moist.      Pharynx: Oropharynx is clear.   Eyes:      Extraocular Movements: Extraocular movements intact.      Conjunctiva/sclera: Conjunctivae normal.   Cardiovascular:      Rate and Rhythm: Normal rate and regular rhythm.      Pulses: Normal pulses.      Heart sounds: Normal heart sounds.   Pulmonary:      Effort: Pulmonary effort is normal.      Breath sounds: Normal breath sounds.   Abdominal:      General: There is no distension.      Palpations: Abdomen is soft.      Tenderness: There is no abdominal tenderness. There is no guarding.   Musculoskeletal:      Right lower leg: No edema.      Left lower leg: No edema.   Skin:     General: Skin is warm and dry.   Neurological:      General: No focal deficit present.      Mental Status: He is alert and oriented to person, place, and time. Mental status is at baseline.   Psychiatric:         Mood and Affect: Mood normal.         Behavior: Behavior normal.         Thought Content: Thought content normal.                  Lab Results: I have reviewed the following results:  Results from last 7 days   Lab Units 12/31/24  1819   WBC Thousand/uL 8.28   HEMOGLOBIN g/dL 16.4   HEMATOCRIT % 47.1   PLATELETS Thousands/uL 215   SEGS PCT % 58   LYMPHO PCT % 27   MONO PCT % 10   EOS PCT % 3     Results from last 7 days   Lab Units 12/31/24  1819   SODIUM mmol/L 136   POTASSIUM mmol/L 3.8   CHLORIDE mmol/L 101   CO2 mmol/L 25   BUN mg/dL 11   CREATININE mg/dL 0.79   ANION GAP mmol/L 10   CALCIUM mg/dL  "10.9*   ALBUMIN g/dL 4.3   TOTAL BILIRUBIN mg/dL 0.54   ALK PHOS U/L 90   ALT U/L 77*   AST U/L 49*   GLUCOSE RANDOM mg/dL 204*             No results found for: \"HGBA1C\"        Imaging Results Review: I personally reviewed the following image studies in PACS and associated radiology reports: chest xray. My interpretation of the radiology images/reports is: Similar to prior chest x-rays, no acute process.  Other Study Results Review: EKG was personally reviewed and my interpretation is: NSR. HR 66..  Also left bundle branch  block    Administrative Statements     Medical decision making: High  Diagnosis addressed: Acute complicated with chest pain, with changing EKGs,   Data:   Reviewed  CBC, CMP, troponin, BNP, lipid panel  Ordered CBC, CMP, A1c, lipid panel  Reviewed external notes from cardiology  Independent interpretation of imaging: Chest x-ray, no acute process  Independent interpretation of testing EKG/telemetry: Dynamic EKGs with normal conduction and  than LBBB  Discussion of management with ER provider: Patient with high restarted including smoking 2 packs/day, dynamic EKG changes           Risk:  Prescription drug management: Aspirin, Protonix,  Discussion for hospitalization with ER provider: Requires hospitalization for chest pain and high respiration  Initiation of parenteral controlled substances: IV morphine      ** Please Note: This note has been constructed using a voice recognition system. **    "

## 2025-01-01 NOTE — ASSESSMENT & PLAN NOTE
Back in April 2023, seen by [facility] cardiology and he was on Norvasc 10 mg daily and hydrochlorothiazide 25 mg daily  He now is currently on losartan 50 mg twice daily.

## 2025-01-01 NOTE — UTILIZATION REVIEW
Initial Clinical Review    Admission: Date/Time/Statement:   Admission Orders (From admission, onward)       Ordered        12/31/24 2058  Place in Observation  Once                          Orders Placed This Encounter   Procedures    Place in Observation     Standing Status:   Standing     Number of Occurrences:   1     Level of Care:   Med Surg [16]     ED Arrival Information       Expected   -    Arrival   12/31/2024 17:47    Acuity   Emergent              Means of arrival   Walk-In    Escorted by   Family Member    Service   Hospitalist    Admission type   Emergency              Arrival complaint   chest pain             Chief Complaint   Patient presents with    Chest Pain     Chest pain started yesterday afternoon now going into neck and jaw area with numbness       Initial Presentation:   44 yom to ER from home for intermittent L sided chest pain radiating into the left arm and up into the jaw x2 days, reports MYERS past month. Hx HTN, HLD, acid reflux, smoker, family hx CAD. Presents hypertensive. Admission EKG: LBBB. Labs: Ca 10.9, ast 49, alt 77, gluc 204, troponin neg.  Placed in observation status for chest pain. Plan: telemetry, cardio consult, accuchecks.        Scheduled Medications:  aspirin, 81 mg, Oral, Daily  atorvastatin, 20 mg, Oral, Daily With Dinner  enoxaparin, 40 mg, Subcutaneous, Q12H  insulin lispro, 2-12 Units, Subcutaneous, TID AC  losartan, 50 mg, Oral, BID  nicotine, 1 patch, Transdermal, Daily  pantoprazole, 40 mg, Oral, Early Morning      PRN Meds:  acetaminophen, 650 mg, Oral, Q6H PRN  morphine injection, 2 mg, Intravenous, Q4H PRN  morphine injection, 4 mg, Intravenous, Q4H PRN      ED Triage Vitals [12/31/24 1753]   Temperature Pulse Respirations Blood Pressure SpO2 Pain Score   97.6 °F (36.4 °C) 76 20 (!) 188/89 94 % 5     Weight (last 2 days)       Date/Time Weight    12/31/24 2112 134 (294.76)    12/31/24 1753 135 (297.18)            Vital Signs (last 3 days)       Date/Time  Temp Pulse Resp BP MAP (mmHg) SpO2 O2 Device Patient Position - Orthostatic VS [place] Coma Scale Score Pain    01/01/25 06:46:26 98.2 °F (36.8 °C) 64 16 139/83 102 97 % -- -- -- --    01/01/25 02:17:57 98.2 °F (36.8 °C) 70 16 138/83 101 95 % -- -- -- --    12/31/24 2124 -- -- -- -- -- -- -- -- 15 No Pain    12/31/24 21:19:12 98.3 °F (36.8 °C) 70 17 153/97 116 96 % -- -- -- --    12/31/24 2030 98.7 °F (37.1 °C) 72 15 162/84 117 95 % None (Room air) Lying -- No Pain    12/31/24 1952 -- -- -- -- -- 98 % -- -- 15 --    12/31/24 1930 98.7 °F (37.1 °C) 65 24 137/76 99 91 % None (Room air) Sitting -- No Pain    12/31/24 1912 -- -- -- -- -- 96 % -- -- 15 --    12/31/24 1906 -- -- -- -- -- -- None (Room air) -- 15 --    12/31/24 1902 98.7 °F (37.1 °C) 68 18 139/81 105 96 % None (Room air) Sitting -- 2    12/31/24 1753 97.6 °F (36.4 °C) 76 20 188/89 -- 94 % None (Room air) Sitting -- 5              Pertinent Labs/Diagnostic Test Results:   Radiology:  XR chest 2 views    (Results Pending)     Cardiology:  ECG 12 lead    by Interface, Ris Results In (12/31 2238)      ECG 12 lead    by Interface, Ris Results In (12/31 1900)      ECG 12 lead    by Interface, Ris Results In (12/31 1854)      ECG 12 lead    by Interface, Ris Results In (12/31 1759)        GI:  No orders to display           Results from last 7 days   Lab Units 01/01/25  0506 12/31/24  1819   WBC Thousand/uL 6.89 8.28   HEMOGLOBIN g/dL 16.2 16.4   HEMATOCRIT % 46.9 47.1   PLATELETS Thousands/uL 194 215   TOTAL NEUT ABS Thousands/µL  --  4.93         Results from last 7 days   Lab Units 01/01/25  0506 12/31/24  1819   SODIUM mmol/L 138 136   POTASSIUM mmol/L 3.8 3.8   CHLORIDE mmol/L 102 101   CO2 mmol/L 27 25   ANION GAP mmol/L 9 10   BUN mg/dL 10 11   CREATININE mg/dL 0.78 0.79   EGFR ml/min/1.73sq m 108 107   CALCIUM mg/dL 9.6 10.9*     Results from last 7 days   Lab Units 01/01/25  0506 12/31/24  1819   AST U/L 52* 49*   ALT U/L 75* 77*   ALK PHOS U/L 83 90  "  TOTAL PROTEIN g/dL 6.1* 6.6   ALBUMIN g/dL 4.0 4.3   TOTAL BILIRUBIN mg/dL 0.61 0.54     Results from last 7 days   Lab Units 01/01/25  0705   POC GLUCOSE mg/dl 220*     Results from last 7 days   Lab Units 01/01/25  0506 12/31/24  1819   GLUCOSE RANDOM mg/dL 192* 204*             No results found for: \"BETA-HYDROXYBUTYRATE\"                   Results from last 7 days   Lab Units 12/31/24  2246 12/31/24  2033 12/31/24  1819   HS TNI 0HR ng/L  --   --  12   HS TNI 2HR ng/L  --  11  --    HSTNI D2 ng/L  --  -1  --    HS TNI 4HR ng/L 13  --   --    HSTNI D4 ng/L 1  --   --                                  Results from last 7 days   Lab Units 12/31/24  1819   BNP pg/mL 23                                                                                       Past Medical History:   Diagnosis Date    Cholelithiases     False positive stress test     Pt states his PCP had him do a stress test. Pt had a false +. Had a clean cardiac cath.    GERD (gastroesophageal reflux disease)     Pt takes Protonix.    HLD (hyperlipidemia)     Hypertension     Loss of teeth due to extraction     Pneumonia     1/20     Present on Admission:   Other chest pain   Tobacco abuse   Primary hypertension   Other hyperlipidemia   Elevated glucose   Elevated LFTs      Admitting Diagnosis: Chest pain [R07.9]  New onset left bundle branch block (LBBB) [I44.7]  Age/Sex: 44 y.o. male    Network Utilization Review Department  ATTENTION: Please call with any questions or concerns to [phone] and carefully listen to the prompts so that you are directed to the right person. All voicemails are confidential.   For Discharge needs, contact Care Management DC Support Team at [phone] opt. [phone]  Send all requests for admission clinical reviews, approved or denied determinations and any other requests to dedicated fax number below belonging to the campus where the patient is receiving treatment. List of dedicated fax numbers for the Facilities:  FACILITY " NAME UR FAX NUMBER   ADMISSION DENIALS (Administrative/Medical Necessity) [phone]   DISCHARGE SUPPORT TEAM (NETWORK) [phone]   PARENT CHILD HEALTH (Maternity/NICU/Pediatrics) [phone]   [facility] [phone]   [facility] [phone]   [facility] [phone]   [facility] [phone]   [facility] [phone]   [facility] [phone]   [facility] [phone]   [facility] [phone]   [facility] [phone]   [facility] [phone]   [facility] [phone]   [facility] [phone]

## 2025-01-01 NOTE — ASSESSMENT & PLAN NOTE
Glucose 204, will do insulin sliding scale and order A1c for the morning  Hemoglobin A1c is pending, given to persistent glucoses, including a fasting glucose which was notably elevated, the patient likely has diabetes  Unfortunate left AMA  Follow-up with his primary care provider

## 2025-01-02 LAB
ATRIAL RATE: 68 BPM
P AXIS: 23 DEGREES
PR INTERVAL: 150 MS
QRS AXIS: 35 DEGREES
QRSD INTERVAL: 76 MS
QT INTERVAL: 382 MS
QTC INTERVAL: 407 MS
T WAVE AXIS: -66 DEGREES
VENTRICULAR RATE: 68 BPM

## 2025-01-02 PROCEDURE — 93010 ELECTROCARDIOGRAM REPORT: CPT | Performed by: INTERNAL MEDICINE

## 2025-01-04 ENCOUNTER — OFFICE VISIT (OUTPATIENT)
Dept: URGENT CARE | Facility: MEDICAL CENTER | Age: 45
End: 2025-01-04
Payer: COMMERCIAL

## 2025-01-04 VITALS
BODY MASS INDEX: 42.37 KG/M2 | DIASTOLIC BLOOD PRESSURE: 80 MMHG | SYSTOLIC BLOOD PRESSURE: 136 MMHG | HEIGHT: 70 IN | OXYGEN SATURATION: 97 % | WEIGHT: 296 LBS | HEART RATE: 95 BPM | TEMPERATURE: 98.9 F | RESPIRATION RATE: 18 BRPM

## 2025-01-04 DIAGNOSIS — R73.9 ELEVATED BLOOD SUGAR: Primary | ICD-10-CM

## 2025-01-04 LAB
GLUCOSE SERPL-MCNC: 273 MG/DL (ref 65–140)
SL AMB POCT GLUCOSE BLD: 273

## 2025-01-04 PROCEDURE — 99283 EMERGENCY DEPT VISIT LOW MDM: CPT

## 2025-01-04 PROCEDURE — 82948 REAGENT STRIP/BLOOD GLUCOSE: CPT

## 2025-01-04 PROCEDURE — G0382 LEV 3 HOSP TYPE B ED VISIT: HCPCS

## 2025-01-04 NOTE — PROGRESS NOTES
[facility]        NAME: Subhash Jennings is a 44 y.o. male  : 1980    MRN: [id number]  DATE: 2025  TIME: 3:58 PM    Assessment and Plan   Elevated blood sugar [R73.9]  1. Elevated blood sugar  POCT blood glucose            Patient Instructions       Follow up with PCP in 3-5 days.  Proceed to  ER if symptoms worsen.    If tests are performed, our office will contact you with results only if changes need to made to the care plan discussed with you at the visit. [facility]    Chief Complaint     Chief Complaint   Patient presents with   • Hyperglycemia     High blood sugar noted with a head ache , blurry vision  noted          History of Present Illness       Patient here with reports of having CP on Tuesday- was at the ER was evaluated and has appt for cardiology for follow up. During hospitalization BS was elevated. 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 He reports his fasting was 280mg/dl, he had scrambled eggs around noon- and then he checked his glucose around 1:00 and it was 350mg/dl. He was previously drinking 3-4 very large foam cups of soda from [employer].     Patient educated on diabetes, diet and glucose testing.  Recommended monitoring glucose 1-2 times daily only at this time and recording to provide information to PCP.  Discussed symptoms likely to be experienced.  Explained value of A1c and what this indicated.  Patient verbalized further understanding.  He currently has no acute symptoms or red flag concerning symptoms.        Review of Systems   Review of Systems   Constitutional:  Negative for appetite change, chills, fatigue and fever.   HENT:  Negative for congestion, rhinorrhea, sore throat and trouble swallowing.    Eyes:  Positive for visual disturbance.   Respiratory:  Negative for cough and shortness of breath.    Gastrointestinal:  Negative for abdominal  pain, constipation, diarrhea, nausea and vomiting.   Skin:  Negative for color change and rash.   Neurological:  Negative for dizziness, light-headedness and headaches.         Current Medications       Current Outpatient Medications:   •  aspirin 81 mg chewable tablet, Chew 1 tablet (81 mg total) daily, Disp: 30 tablet, Rfl: 0  •  atorvastatin (LIPITOR) 20 mg tablet, Take 1 tablet (20 mg total) by mouth daily with dinner, Disp: 30 tablet, Rfl: 0  •  losartan (COZAAR) 25 mg tablet, Take 50 mg by mouth 2 (two) times a day, Disp: , Rfl:   •  pantoprazole (PROTONIX) 40 mg tablet, Take 40 mg by mouth daily, Disp: , Rfl:   •  tamsulosin (FLOMAX) 0.4 mg, Take 1 capsule (0.4 mg total) by mouth daily with dinner for 7 days, Disp: 7 capsule, Rfl: 0    Current Allergies     Allergies as of 01/04/2025   • (No Known Allergies)            The following portions of the patient's history were reviewed and updated as appropriate: allergies, current medications, past family history, past medical history, past social history, past surgical history and problem list.     Past Medical History:   Diagnosis Date   • Cholelithiases    • False positive stress test     Pt states his PCP had him do a stress test. Pt had a false +. Had a clean cardiac cath.   • GERD (gastroesophageal reflux disease)     Pt takes Protonix.   • HLD (hyperlipidemia)    • Hypertension    • Loss of teeth due to extraction    • Pneumonia     1/20       Past Surgical History:   Procedure Laterality Date   • CARDIAC SURGERY      cardiac cath done after abnormal stress test. Clean cath.   • FOREIGN BODY REMOVAL Right     Pt had glass removed from 5th finger   • MT LAPAROSCOPY SURG CHOLECYSTECTOMY N/A 3/27/2020    Procedure: LAPAROSCOPIC CHOLECYSTECTOMY;  Surgeon: Debi Robb DO;  Location:  MAIN OR;  Service: General   • WISDOM TOOTH EXTRACTION         Family History   Problem Relation Age of Onset   • Coronary artery disease Father          Medications have  "been verified.        Objective   /80   Pulse 95   Temp 98.9 °F (37.2 °C)   Resp 18   Ht 5' 10\" (1.778 m)   Wt 134 kg (296 lb)   SpO2 97%   BMI 42.47 kg/m²        Physical Exam     Physical Exam  Vitals and nursing note reviewed.   Constitutional:       General: He is awake. He is not in acute distress.     Appearance: Normal appearance. He is well-developed. He is obese. He is not ill-appearing.   HENT:      Head: Normocephalic and atraumatic.      Nose: Nose normal.      Mouth/Throat:      Lips: Pink.      Mouth: Mucous membranes are moist.      Pharynx: Oropharynx is clear.   Eyes:      Extraocular Movements: Extraocular movements intact.      Conjunctiva/sclera: Conjunctivae normal.      Pupils: Pupils are equal, round, and reactive to light.   Cardiovascular:      Rate and Rhythm: Normal rate and regular rhythm.      Pulses: Normal pulses.      Heart sounds: Normal heart sounds.   Pulmonary:      Effort: Pulmonary effort is normal.      Breath sounds: Normal breath sounds.   Abdominal:      General: Abdomen is flat. Bowel sounds are normal.      Palpations: Abdomen is soft.   Musculoskeletal:         General: Normal range of motion.      Cervical back: Full passive range of motion without pain, normal range of motion and neck supple.   Skin:     General: Skin is warm and dry.      Capillary Refill: Capillary refill takes less than 2 seconds.      Findings: No rash.   Neurological:      General: No focal deficit present.      Mental Status: He is alert and oriented to person, place, and time. Mental status is at baseline.   Psychiatric:         Mood and Affect: Mood normal.         Behavior: Behavior normal. Behavior is cooperative.                   "

## 2025-01-04 NOTE — PATIENT INSTRUCTIONS
Please follow up with your primary provider in the next several days. Should you have any worsening of symptoms, or lack of improvement please be re-evaluated. If needed for significant concerns, consider 911 or ER evaluation.       Monitor Blood sugars only about 1-2x/day and keep track of them as discussed.

## 2025-01-30 LAB
ATRIAL RATE: 66 BPM
ATRIAL RATE: 67 BPM
ATRIAL RATE: 73 BPM
P AXIS: 43 DEGREES
P AXIS: 43 DEGREES
P AXIS: 44 DEGREES
PR INTERVAL: 148 MS
PR INTERVAL: 156 MS
PR INTERVAL: 166 MS
QRS AXIS: -29 DEGREES
QRS AXIS: -39 DEGREES
QRS AXIS: 57 DEGREES
QRSD INTERVAL: 144 MS
QRSD INTERVAL: 150 MS
QRSD INTERVAL: 76 MS
QT INTERVAL: 368 MS
QT INTERVAL: 404 MS
QT INTERVAL: 420 MS
QTC INTERVAL: 385 MS
QTC INTERVAL: 443 MS
QTC INTERVAL: 445 MS
T WAVE AXIS: -58 DEGREES
T WAVE AXIS: 110 DEGREES
T WAVE AXIS: 120 DEGREES
VENTRICULAR RATE: 66 BPM
VENTRICULAR RATE: 67 BPM
VENTRICULAR RATE: 73 BPM

## 2025-02-11 ENCOUNTER — OFFICE VISIT (OUTPATIENT)
Dept: CARDIOLOGY CLINIC | Facility: HOSPITAL | Age: 45
End: 2025-02-11
Payer: COMMERCIAL

## 2025-02-11 VITALS
BODY MASS INDEX: 42.83 KG/M2 | HEIGHT: 70 IN | HEART RATE: 75 BPM | DIASTOLIC BLOOD PRESSURE: 96 MMHG | OXYGEN SATURATION: 95 % | SYSTOLIC BLOOD PRESSURE: 164 MMHG | WEIGHT: 299.2 LBS

## 2025-02-11 DIAGNOSIS — R07.9 CHEST PAIN: ICD-10-CM

## 2025-02-11 DIAGNOSIS — R07.89 OTHER CHEST PAIN: Primary | ICD-10-CM

## 2025-02-11 DIAGNOSIS — I10 PRIMARY HYPERTENSION: ICD-10-CM

## 2025-02-11 DIAGNOSIS — E78.49 OTHER HYPERLIPIDEMIA: ICD-10-CM

## 2025-02-11 DIAGNOSIS — I44.7 LBBB (LEFT BUNDLE BRANCH BLOCK): ICD-10-CM

## 2025-02-11 DIAGNOSIS — Z72.0 TOBACCO ABUSE: ICD-10-CM

## 2025-02-11 PROCEDURE — 99244 OFF/OP CNSLTJ NEW/EST MOD 40: CPT | Performed by: INTERNAL MEDICINE

## 2025-02-11 PROCEDURE — 93000 ELECTROCARDIOGRAM COMPLETE: CPT | Performed by: INTERNAL MEDICINE

## 2025-02-11 RX ORDER — METOPROLOL TARTRATE 25 MG/1
25 TABLET, FILM COATED ORAL ONCE
Qty: 1 TABLET | Refills: 0 | Status: SHIPPED | OUTPATIENT
Start: 2025-02-11 | End: 2025-02-11

## 2025-02-11 RX ORDER — METOPROLOL SUCCINATE 25 MG/1
25 TABLET, EXTENDED RELEASE ORAL DAILY
Qty: 90 TABLET | Refills: 3 | Status: SHIPPED | OUTPATIENT
Start: 2025-02-11

## 2025-02-11 NOTE — PROGRESS NOTES
Subhash WINCHESTER [facility]  1980 [id number]  [facility] [address] [address] [phone]    1. Other chest pain  metoprolol succinate (TOPROL-XL) 25 mg 24 hr tablet    Echo complete w/ contrast if indicated    CTA Cardiac w FFR if needed    Basic metabolic panel    metoprolol tartrate (LOPRESSOR) 25 mg tablet      2. Chest pain  POCT ECG    Ambulatory referral to Cardiology    metoprolol succinate (TOPROL-XL) 25 mg 24 hr tablet    Echo complete w/ contrast if indicated    CTA Cardiac w FFR if needed    Basic metabolic panel    metoprolol tartrate (LOPRESSOR) 25 mg tablet      3. Tobacco abuse  metoprolol succinate (TOPROL-XL) 25 mg 24 hr tablet    Echo complete w/ contrast if indicated    CTA Cardiac w FFR if needed    Basic metabolic panel    metoprolol tartrate (LOPRESSOR) 25 mg tablet      4. Primary hypertension  metoprolol succinate (TOPROL-XL) 25 mg 24 hr tablet    Echo complete w/ contrast if indicated    CTA Cardiac w FFR if needed    Basic metabolic panel    metoprolol tartrate (LOPRESSOR) 25 mg tablet      5. Other hyperlipidemia  metoprolol succinate (TOPROL-XL) 25 mg 24 hr tablet    Echo complete w/ contrast if indicated    CTA Cardiac w FFR if needed    Basic metabolic panel    metoprolol tartrate (LOPRESSOR) 25 mg tablet      6. LBBB (left bundle branch block)  metoprolol succinate (TOPROL-XL) 25 mg 24 hr tablet    Echo complete w/ contrast if indicated    CTA Cardiac w FFR if needed    Basic metabolic panel    metoprolol tartrate (LOPRESSOR) 25 mg tablet          Discussion/Summary: He has had episodes of chest pain that have some typical anginal features.  He tells me he had an abnormal stress test about 10 years ago that led to a heart catheterization that did not show any significant blockages.  Would avoid nuclear stress test at this point in time.  Check cardiac CTA to evaluate direct coronary anatomy.  Also use FFR if  any lesions are found.  Will start the patient on metoprolol along with his aspirin and statin.  Will give him an additional dose an hour before his CTA.  Continue work on diabetes.  Blood pressure did come down.  Normally checks at home he is in the 130/80 range.  Will continue to follow him closely make further recommendations after testing.  Will also check an echocardiogram.    Interval History: Very pleasant 44-year-old gentleman new onset diabetes, chest pain, shortness of breath, hyperlipidemia presents to establish care with me following a outpatient consult from Dr. Lewis.  The patient had an episode of chest pain on New Year's Tatiana with radiation up through the right shoulder into the neck and ear.  This was on activity.  Since that point in time he has had some mild chest discomfort return.  He also is significantly short of breath with moderate level activity.  Denies any palpitations or flutters.  Has been lower extreme edema, PND, orthopnea.  Been taking medications as prescribed.    Medical Problems       Problem List       Symptomatic cholelithiasis    Other chest pain    Tobacco abuse    Primary hypertension    Other hyperlipidemia    Elevated glucose    Elevated LFTs    LBBB (left bundle branch block)    Chest pain        Past Medical History:   Diagnosis Date    Cholelithiases     False positive stress test     Pt states his PCP had him do a stress test. Pt had a false +. Had a clean cardiac cath.    GERD (gastroesophageal reflux disease)     Pt takes Protonix.    HLD (hyperlipidemia)     Hypertension     Loss of teeth due to extraction     Pneumonia     1/20     Social History     Socioeconomic History    Marital status: /Civil Union     Spouse name: Not on file    Number of children: Not on file    Years of education: Not on file    Highest education level: Not on file   Occupational History    Not on file   Tobacco Use    Smoking status: Every Day     Current packs/day: 2.00     Average  packs/day: 2.0 packs/day for 20.0 years (40.0 ttl pk-yrs)     Types: Cigarettes    Smokeless tobacco: Never    Tobacco comments:     Not ready to quit   Vaping Use    Vaping status: Never Used   Substance and Sexual Activity    Alcohol use: Yes    Drug use: Not Currently    Sexual activity: Not on file   Other Topics Concern    Not on file   Social History Narrative    Not on file     Social Drivers of Health     Financial Resource Strain: Not on file   Food Insecurity: No Food Insecurity (2024)    Nursing - Inadequate Food Risk Classification     Worried About Running Out of Food in the Last Year: Not on file     Ran Out of Food in the Last Year: Not on file     Ran Out of Food in the Last Year: Never true   Transportation Needs: No Transportation Needs (2024)    Nursing - Transportation Risk Classification     Lack of Transportation: Not on file     Lack of Transportation: No   Physical Activity: Not on file   Stress: Not on file   Social Connections: Unknown (2024)    Received from [employer]    Social [place]     How often do you feel lonely or isolated from those around you? (Adult - for ages 18 years and over): Not on file   Intimate Partner Violence: Unknown (2024)    Nursing IPS     Feels Physically and Emotionally Safe: Not on file     Physically Hurt by Someone: Not on file     Humiliated or Emotionally Abused by Someone: Not on file     Physically Hurt by Someone: No     Hurt or Threatened by Someone: No   Housing Stability: Unknown (2024)    Nursing: Inadequate Housing Risk Classification     Has Housing: Not on file     Worried About Losing Housing: Not on file     Unable to Get Utilities: Not on file     Unable to Pay for Housing in the Last Year: No     Has Housin      Family History   Problem Relation Age of Onset    Coronary artery disease Father      Past Surgical History:   Procedure Laterality Date    CARDIAC SURGERY      cardiac cath done after abnormal stress  test. Clean cath.    FOREIGN BODY REMOVAL Right     Pt had glass removed from 5th finger    MT LAPAROSCOPY SURG CHOLECYSTECTOMY N/A 3/27/2020    Procedure: LAPAROSCOPIC CHOLECYSTECTOMY;  Surgeon: Debi Robb DO;  Location:  MAIN OR;  Service: General    WISDOM TOOTH EXTRACTION         Current Outpatient Medications:     aspirin 81 mg chewable tablet, Chew 1 tablet (81 mg total) daily, Disp: 30 tablet, Rfl: 0    atorvastatin (LIPITOR) 20 mg tablet, Take 1 tablet (20 mg total) by mouth daily with dinner, Disp: 30 tablet, Rfl: 0    losartan (COZAAR) 25 mg tablet, Take 50 mg by mouth 2 (two) times a day, Disp: , Rfl:     metFORMIN (GLUCOPHAGE) 500 mg tablet, Take 500 mg by mouth daily with breakfast, Disp: , Rfl:     metoprolol succinate (TOPROL-XL) 25 mg 24 hr tablet, Take 1 tablet (25 mg total) by mouth daily, Disp: 90 tablet, Rfl: 3    metoprolol tartrate (LOPRESSOR) 25 mg tablet, Take 1 tablet (25 mg total) by mouth 1 (one) time for 1 dose Take one hour prior to CTA, Disp: 1 tablet, Rfl: 0    pantoprazole (PROTONIX) 40 mg tablet, Take 40 mg by mouth daily, Disp: , Rfl:     tamsulosin (FLOMAX) 0.4 mg, Take 1 capsule (0.4 mg total) by mouth daily with dinner for 7 days (Patient not taking: Reported on 2/11/2025), Disp: 7 capsule, Rfl: 0  No Known Allergies    Labs:     Chemistry        Component Value Date/Time    K 3.8 01/01/2025 0506    K 4.0 03/31/2023 1655     01/01/2025 0506     03/31/2023 1655    CO2 27 01/01/2025 0506    CO2 25 03/31/2023 1655    BUN 10 01/01/2025 0506    BUN 13 03/31/2023 1655    BUN 15 03/15/2020 0519    CREATININE 0.78 01/01/2025 0506    CREATININE 0.8 03/31/2023 1655        Component Value Date/Time    CALCIUM 9.6 01/01/2025 0506    CALCIUM 8.9 03/31/2023 1655    ALKPHOS 83 01/01/2025 0506    ALKPHOS 80 03/31/2023 1655    AST 52 (H) 01/01/2025 0506    AST 24 03/31/2023 1655    ALT 75 (H) 01/01/2025 0506    ALT 47 03/31/2023 1655            No results found for:  "\"CHOL\"  Lab Results   Component Value Date    HDL 27 (L) 01/01/2025     Lab Results   Component Value Date    LDLCALC 143 (H) 01/01/2025     Lab Results   Component Value Date    TRIG 251 (H) 01/01/2025    TRIG 434 (H) 09/10/2021     No results found for: \"CHOLHDL\"    Imaging: No results found.    ECG: Sinus rhythm left bundle branch block      Review of Systems   Constitutional: Negative.   HENT: Negative.     Eyes: Negative.    Cardiovascular:  Positive for chest pain and dyspnea on exertion.   Respiratory: Negative.     Endocrine: Negative.    Hematologic/Lymphatic: Negative.    Skin: Negative.    Musculoskeletal: Negative.    Gastrointestinal: Negative.    Genitourinary: Negative.    Neurological: Negative.    Psychiatric/Behavioral: Negative.     All other systems reviewed and are negative.      Vitals:    02/11/25 1445   BP: 164/96   Pulse: 75   SpO2: 95%     Vitals:    02/11/25 1445   Weight: 136 kg (299 lb 3.2 oz)     Height: 5' 10\" (177.8 cm)   Body mass index is 42.93 kg/m².    Physical Exam:  Vital signs reviewed.  General appearance:  Appears stated age, alert, well appearing and in no distress  HEENT:  PERRLA, EOMI, no scleral icterus, no conjunctival pallor  NECK:  Supple, No elevated JVP, no thyromegaly, no carotid bruits, no JVD  HEART:  Regular rate and rhythm, normal S1/S2, no S3/S4, no murmur or rub, PMI nondisplaced  LUNGS:  Clear to auscultation bilaterally, no wheezes rales or rhonchi  ABDOMEN:  Soft, non-tender, positive bowel sounds, no rebound or guarding, no organomegaly   EXTREMITIES:  Normal range of motion.  No clubbing or cyanosis. No edema  VASCULAR:  Normal pedal pulses   SKIN: No lesions or rashes on exposed skin  NEURO:  CN II-XII intact, no focal deficits    "

## 2025-02-28 NOTE — PRE-PROCEDURE INSTRUCTIONS
Call placed to patient to discuss upcoming CCTA @ [facility] ([place]  Reason for exam, allergies, medications and cardiac history reviewed.  Pre procedure instructions reviewed with patient.  Pre-medication, Metoprolol, reviewed with patient.  Procedure and post-procedure expectations and instructions reviewed.  Recommended patient to have a  for procedure.  Appointment reminder given:  3/3/25 @ 0900.  Reminder given for echo appt @ 0800.  Patient verbalized understanding and denied any questions at this time.

## 2025-03-03 ENCOUNTER — HOSPITAL ENCOUNTER (OUTPATIENT)
Dept: NON INVASIVE DIAGNOSTICS | Facility: HOSPITAL | Age: 45
Discharge: HOME | End: 2025-03-03
Payer: COMMERCIAL

## 2025-03-03 ENCOUNTER — HOSPITAL ENCOUNTER (OUTPATIENT)
Dept: CT IMAGING | Facility: HOSPITAL | Age: 45
Discharge: HOME | End: 2025-03-03
Payer: COMMERCIAL

## 2025-03-03 VITALS
DIASTOLIC BLOOD PRESSURE: 96 MMHG | SYSTOLIC BLOOD PRESSURE: 164 MMHG | BODY MASS INDEX: 42.8 KG/M2 | WEIGHT: 299 LBS | HEART RATE: 80 BPM | HEIGHT: 70 IN

## 2025-03-03 VITALS — DIASTOLIC BLOOD PRESSURE: 67 MMHG | RESPIRATION RATE: 18 BRPM | HEART RATE: 62 BPM | SYSTOLIC BLOOD PRESSURE: 130 MMHG

## 2025-03-03 DIAGNOSIS — R07.9 CHEST PAIN: ICD-10-CM

## 2025-03-03 DIAGNOSIS — R07.89 OTHER CHEST PAIN: ICD-10-CM

## 2025-03-03 DIAGNOSIS — Z72.0 TOBACCO ABUSE: ICD-10-CM

## 2025-03-03 DIAGNOSIS — I10 PRIMARY HYPERTENSION: ICD-10-CM

## 2025-03-03 DIAGNOSIS — I44.7 LBBB (LEFT BUNDLE BRANCH BLOCK): ICD-10-CM

## 2025-03-03 DIAGNOSIS — E78.49 OTHER HYPERLIPIDEMIA: ICD-10-CM

## 2025-03-03 LAB
AORTIC ROOT: 3.3 CM
ASCENDING AORTA: 3.2 CM
BSA FOR ECHO PROCEDURE: 2.48 M2
E WAVE DECELERATION TIME: 201 MS
E/A RATIO: 1.32
FRACTIONAL SHORTENING: 34 (ref 28–44)
INTERVENTRICULAR SEPTUM IN DIASTOLE (PARASTERNAL SHORT AXIS VIEW): 1.9 CM
INTERVENTRICULAR SEPTUM: 1.9 CM (ref 0.6–1.1)
LAAS-AP2: 13.9 CM2
LAAS-AP4: 12.5 CM2
LEFT ATRIUM SIZE: 5.4 CM
LEFT ATRIUM VOLUME (MOD BIPLANE): 37 ML
LEFT ATRIUM VOLUME INDEX (MOD BIPLANE): 14.9 ML/M2
LEFT INTERNAL DIMENSION IN SYSTOLE: 2.9 CM (ref 2.1–4)
LEFT VENTRICULAR INTERNAL DIMENSION IN DIASTOLE: 4.4 CM (ref 3.5–6)
LEFT VENTRICULAR POSTERIOR WALL IN END DIASTOLE: 1.6 CM
LEFT VENTRICULAR STROKE VOLUME: 54 ML
LV EF US.2D.A4C+ESTIMATED: 64 %
LVSV (TEICH): 54 ML
MV E'TISSUE VEL-SEP: 12 CM/S
MV PEAK A VEL: 0.71 M/S
MV PEAK E VEL: 94 CM/S
MV STENOSIS PRESSURE HALF TIME: 58 MS
MV VALVE AREA P 1/2 METHOD: 3.79
RIGHT ATRIUM AREA SYSTOLE A4C: 15.9 CM2
RIGHT VENTRICLE ID DIMENSION: 2.7 CM
SL CV LEFT ATRIUM LENGTH A2C: 5.1 CM
SL CV LV EF: 60
SL CV PED ECHO LEFT VENTRICLE DIASTOLIC VOLUME (MOD BIPLANE) 2D: 87 ML
SL CV PED ECHO LEFT VENTRICLE SYSTOLIC VOLUME (MOD BIPLANE) 2D: 33 ML
TR MAX PG: 21 MMHG
TR PEAK VELOCITY: 2.3 M/S
TRICUSPID ANNULAR PLANE SYSTOLIC EXCURSION: 2.1 CM
TRICUSPID VALVE PEAK REGURGITATION VELOCITY: 2.29 M/S

## 2025-03-03 PROCEDURE — 93306 TTE W/DOPPLER COMPLETE: CPT

## 2025-03-03 PROCEDURE — 75574 CT ANGIO HRT W/3D IMAGE: CPT

## 2025-03-03 PROCEDURE — 75580 N-INVAS EST C FFR SW ALY CTA: CPT

## 2025-03-03 RX ORDER — METOPROLOL TARTRATE 1 MG/ML
5 INJECTION, SOLUTION INTRAVENOUS
Status: DISCONTINUED | OUTPATIENT
Start: 2025-03-03 | End: 2025-03-07 | Stop reason: HOSPADM

## 2025-03-03 RX ORDER — NITROGLYCERIN 0.4 MG/1
0.8 TABLET SUBLINGUAL ONCE
Status: COMPLETED | OUTPATIENT
Start: 2025-03-03 | End: 2025-03-03

## 2025-03-03 RX ADMIN — NITROGLYCERIN 0.8 MG: 0.4 TABLET SUBLINGUAL at 08:54

## 2025-03-03 RX ADMIN — IOHEXOL 94 ML: 350 INJECTION, SOLUTION INTRAVENOUS at 09:07

## 2025-03-03 NOTE — NURSING NOTE
Patient arrived for coronary CT angiography. Test education reviewed with patient. Medications and allergies verified. Pt denies PDE5 inhibitor use. Patient took Metoprolol as instructed by cardiology. PO nitro given per protocol. See vitals flowsheet. Tolerated test well. Instructed to increase fluid intake remainder of day. Vitals stable, patient asymptomatic upon departure.

## 2025-03-03 NOTE — LETTER
[facility]  [address] [place] [address]      March 7, 2025    MRN: [id number]     Phone: [phone]     Dear  Michaela,    You recently had a(n) Cat Scan performed on 3/3/2025 at  [facility] that was requested by Phu Boudreaux DO. The study was reviewed by a radiologist, which is a physician who specializes in medical imaging. The radiologist issued a report describing his or her findings. In that report there was a finding that the radiologist felt warranted further discussion with your health care provider and that discussion would be beneficial to you.      The results were sent to Phu Boudreaux DO on 03/03/2025  1:25 PM. We recommend that you contact Phu Boudreaux DO at [phone] or set up an appointment to discuss the results of the imaging test. If you have already heard from Phu Boudreaux DO regarding the results of your study, you can disregard this letter.     This letter is not meant to alarm you, but intended to encourage you to follow-up on your results with the provider that sent you for the imaging study. In addition, we have enclosed answers to frequently asked questions by other patients who have also received a letter to review results with their health care provider (see page two).      Thank you for choosing [facility] for your medical imaging needs.                                                                                                                                                        FREQUENTLY ASKED QUESTIONS    Why am I receiving this letter?  [place] State Law requires us to notify patients who have findings on imaging exams that may require more testing or follow-up with a health professional within the next 3 months.        How serious is the finding on the imaging test?  This letter is sent to all patients who may need follow-up or more testing within  the next 3 months.  Receiving this letter does not necessarily mean you have a life-threatening imaging finding or disease.  Recommendations in the radiologist’s imaging report are general in nature and it is up to your healthcare provider to say whether those recommendations make sense for your situation.  You are strongly encouraged to talk to your health care provider about the results and ask whether additional steps need to be taken.    Where can I get a copy of the final report for my recent radiology exam?  To get a full copy of the report you can access your records online at [email or web address] or please contact [facility] Medical Records Department at [phone] Monday through Friday between 8 am and 6 pm.         What do I need to do now?           Please contact your health care provider who requested the imaging study to discuss what further actions (if any) are needed.  You may have already heard from (your ordering provider) in regard to this test in which case you can disregard this letter.        NOTICE IN ACCORDANCE WITH THE [place] STATE “PATIENT TEST RESULT INFORMATION ACT OF 2018”    You are receiving this notice as a result of a determination by your diagnostic imaging service that further discussions of your test results are warranted and would be beneficial to you.    The complete results of your test or tests have been or will be sent to the health care practitioner that ordered the test or tests. It is recommended that you contact your health care practitioner to discuss your results as soon as possible.

## 2025-03-10 ENCOUNTER — TELEPHONE (OUTPATIENT)
Dept: CARDIOLOGY CLINIC | Facility: CLINIC | Age: 45
End: 2025-03-10

## 2025-03-10 DIAGNOSIS — I10 PRIMARY HYPERTENSION: ICD-10-CM

## 2025-03-10 DIAGNOSIS — R07.9 CHEST PAIN, UNSPECIFIED TYPE: Primary | ICD-10-CM

## 2025-03-10 NOTE — TELEPHONE ENCOUNTER
----- Message from Phu Boudreaux DO sent at 3/10/2025  1:00 PM EDT -----  Can you please set up for left heart cath, orders in     Thanks  Steven

## 2025-03-10 NOTE — TELEPHONE ENCOUNTER
Left voicemail on machine informing patient to call and schedule a LEFT Heart Cath procedure.     Thanks,  Addis Srinivasan

## 2025-03-10 NOTE — TELEPHONE ENCOUNTER
Patient scheduled for LEFT Heart Cath on 3/17/25  at [facility] with Dr. JUARES.      Instructions sent to patient through [employer].      Patient aware of all general instructions.    Medication holds:     Metformin - Take your regular dose day/evening before procedure and do not take morning of procedure.      Labs to be done NO LATER THAN 3/12/25   CMP / CBC (fasting 8 hours)

## 2025-03-13 ENCOUNTER — APPOINTMENT (OUTPATIENT)
Dept: LAB | Facility: CLINIC | Age: 45
End: 2025-03-13
Payer: COMMERCIAL

## 2025-03-13 DIAGNOSIS — I44.7 LBBB (LEFT BUNDLE BRANCH BLOCK): ICD-10-CM

## 2025-03-13 DIAGNOSIS — I10 PRIMARY HYPERTENSION: ICD-10-CM

## 2025-03-13 DIAGNOSIS — Z72.0 TOBACCO ABUSE: ICD-10-CM

## 2025-03-13 DIAGNOSIS — E78.49 OTHER HYPERLIPIDEMIA: ICD-10-CM

## 2025-03-13 DIAGNOSIS — R07.9 CHEST PAIN, UNSPECIFIED TYPE: ICD-10-CM

## 2025-03-13 DIAGNOSIS — R07.9 CHEST PAIN: ICD-10-CM

## 2025-03-13 DIAGNOSIS — R07.89 OTHER CHEST PAIN: ICD-10-CM

## 2025-03-13 LAB
ALBUMIN SERPL BCG-MCNC: 4.4 G/DL (ref 3.5–5)
ALP SERPL-CCNC: 81 U/L (ref 34–104)
ALT SERPL W P-5'-P-CCNC: 51 U/L (ref 7–52)
ANION GAP SERPL CALCULATED.3IONS-SCNC: 10 MMOL/L (ref 4–13)
AST SERPL W P-5'-P-CCNC: 33 U/L (ref 13–39)
BASOPHILS # BLD AUTO: 0.05 THOUSANDS/ÂΜL (ref 0–0.1)
BASOPHILS NFR BLD AUTO: 1 % (ref 0–1)
BILIRUB SERPL-MCNC: 0.52 MG/DL (ref 0.2–1)
BUN SERPL-MCNC: 11 MG/DL (ref 5–25)
CALCIUM SERPL-MCNC: 9 MG/DL (ref 8.4–10.2)
CHLORIDE SERPL-SCNC: 102 MMOL/L (ref 96–108)
CO2 SERPL-SCNC: 26 MMOL/L (ref 21–32)
CREAT SERPL-MCNC: 0.65 MG/DL (ref 0.6–1.3)
EOSINOPHIL # BLD AUTO: 0.33 THOUSAND/ÂΜL (ref 0–0.61)
EOSINOPHIL NFR BLD AUTO: 3 % (ref 0–6)
ERYTHROCYTE [DISTWIDTH] IN BLOOD BY AUTOMATED COUNT: 12.5 % (ref 11.6–15.1)
GFR SERPL CREATININE-BSD FRML MDRD: 116 ML/MIN/1.73SQ M
GLUCOSE P FAST SERPL-MCNC: 192 MG/DL (ref 65–99)
HCT VFR BLD AUTO: 47.2 % (ref 36.5–49.3)
HGB BLD-MCNC: 16.1 G/DL (ref 12–17)
IMM GRANULOCYTES # BLD AUTO: 0.08 THOUSAND/UL (ref 0–0.2)
IMM GRANULOCYTES NFR BLD AUTO: 1 % (ref 0–2)
LYMPHOCYTES # BLD AUTO: 2.8 THOUSANDS/ÂΜL (ref 0.6–4.47)
LYMPHOCYTES NFR BLD AUTO: 29 % (ref 14–44)
MCH RBC QN AUTO: 32.5 PG (ref 26.8–34.3)
MCHC RBC AUTO-ENTMCNC: 34.1 G/DL (ref 31.4–37.4)
MCV RBC AUTO: 95 FL (ref 82–98)
MONOCYTES # BLD AUTO: 0.81 THOUSAND/ÂΜL (ref 0.17–1.22)
MONOCYTES NFR BLD AUTO: 8 % (ref 4–12)
NEUTROPHILS # BLD AUTO: 5.61 THOUSANDS/ÂΜL (ref 1.85–7.62)
NEUTS SEG NFR BLD AUTO: 58 % (ref 43–75)
NRBC BLD AUTO-RTO: 0 /100 WBCS
PLATELET # BLD AUTO: 214 THOUSANDS/UL (ref 149–390)
PMV BLD AUTO: 11.1 FL (ref 8.9–12.7)
POTASSIUM SERPL-SCNC: 4 MMOL/L (ref 3.5–5.3)
PROT SERPL-MCNC: 6.5 G/DL (ref 6.4–8.4)
RBC # BLD AUTO: 4.96 MILLION/UL (ref 3.88–5.62)
SODIUM SERPL-SCNC: 138 MMOL/L (ref 135–147)
WBC # BLD AUTO: 9.68 THOUSAND/UL (ref 4.31–10.16)

## 2025-03-13 PROCEDURE — 80053 COMPREHEN METABOLIC PANEL: CPT

## 2025-03-13 PROCEDURE — 36415 COLL VENOUS BLD VENIPUNCTURE: CPT

## 2025-03-13 PROCEDURE — 85025 COMPLETE CBC W/AUTO DIFF WBC: CPT

## 2025-03-13 NOTE — TELEPHONE ENCOUNTER
Called & spoke with pt he is going to have his blood work done today 3/13/25 for his procedure on 3/17/25.

## 2025-03-14 NOTE — H&P
H&P Exam - Cardiology   Subhash Jennings 44 y.o. male MRN: [id number]  Unit/Bed#:  Encounter: [id number]     Office cardiologist: Dr. Boudreaux    PCP: Reuben Lewis, DO    Assessment & Plan   Chest discomfort with some typical anginal features  -Cardiac CTA showing 50 to 70% stenosis mid LAD.  Mild to moderate stenosis of proximal LAD  Moderate.  Mild stenosis involving left circumflex and RCA.  -Home Rx aspirin 81 mg daily    Hypertension  -Home Rx losartan 25 mg daily, metoprolol succinate 25 mg daily    Diagnosed type 2 diabetes, new onset   -Hemoglobin A1c 10.2 (1/2025)  -Started on Glucophage 500 mg daily    Hyperlipidemia  -Cholesterol 220, triglycerides 251, HDL 27 and .  -Atorvastatin 20 mg daily    Tobacco abuse  -Current smoker 2 packs/day for 30 years    Obesity  -BMI 42.90 kg/m2      Plan:  [facility]     There were no vitals taken for this visit.    History of Present Illness   HPI:  Subhash Jennings is a 44 y.o. male who presents to [facility] for outpatient elective cardiac for evaluation of chest discomfort with some typical anginal features.  He underwent cardiac CTA which showed 50 to 70% stenosis in mid LAD.  Mild to moderate stenosis of proximal LAD.  Left circumflex mild stenosis 25 to 49%.  Mild stenosis of RCA 25 to 49%.    Past medical history is significant for new onset diabetes (hemoglobin A1c in January 2025 reported as 10.2), hypertension, hyperlipidemia and obesity (42.90 kg/m2).     Social history is remarkable for cigarette smoking 2 packs/day for 20 years.  Denies vaping.  No history of alcohol abuse.    Historical Information   Past Medical History:   Diagnosis Date    Cholelithiases     False positive stress test     Pt states his PCP had him do a stress test. Pt had a false +. Had a clean cardiac cath.    GERD (gastroesophageal reflux disease)     Pt takes Protonix.    HLD (hyperlipidemia)     Hypertension     Loss of teeth due to extraction     Pneumonia     1/20     Past  Surgical History:   Procedure Laterality Date    CARDIAC SURGERY      cardiac cath done after abnormal stress test. Clean cath.    FOREIGN BODY REMOVAL Right     Pt had glass removed from 5th finger    [place] LAPAROSCOPY SURG CHOLECYSTECTOMY N/A 3/27/2020    Procedure: LAPAROSCOPIC CHOLECYSTECTOMY;  Surgeon: Debi Robb DO;  Location:  MAIN OR;  Service: General    WISDOM TOOTH EXTRACTION       Social History   Social History     Substance and Sexual Activity   Alcohol Use Yes     Social History     Substance and Sexual Activity   Drug Use Not Currently     Social History     Tobacco Use   Smoking Status Every Day    Current packs/day: 2.00    Average packs/day: 2.0 packs/day for 20.0 years (40.0 ttl pk-yrs)    Types: Cigarettes   Smokeless Tobacco Never   Tobacco Comments    Not ready to quit     Family History:   Family History   Problem Relation Age of Onset    Coronary artery disease Father        Meds/Allergies   all medications and allergies reviewed  No Known Allergies    Review of Systems   Constitutional: Negative for chills and fever.   HENT:  Negative for congestion and sore throat.    Eyes:  Negative for blurred vision and double vision.   Cardiovascular:  Positive for chest pain and palpitations. Negative for claudication, dyspnea on exertion, irregular heartbeat, leg swelling, near-syncope, orthopnea, paroxysmal nocturnal dyspnea and syncope.   Respiratory:  Positive for cough. Negative for shortness of breath, sleep disturbances due to breathing, snoring and wheezing.    Hematologic/Lymphatic: Negative for bleeding problem. Does not bruise/bleed easily.   Gastrointestinal:  Negative for abdominal pain, constipation, diarrhea, nausea and vomiting.   Genitourinary:  Negative for dysuria and hematuria.         Physical Exam  Constitutional:       General: He is not in acute distress.     Appearance: He is obese. He is not toxic-appearing.   HENT:      Head: Normocephalic and atraumatic.      Right  Ear: External ear normal.      Left Ear: External ear normal.      Nose: Nose normal.   Eyes:      General: No scleral icterus.        Right eye: No discharge.         Left eye: No discharge.   Neck:      Vascular: No carotid bruit.   Cardiovascular:      Rate and Rhythm: Normal rate and regular rhythm.      Pulses: Normal pulses.      Heart sounds: No murmur heard.     No gallop.   Pulmonary:      Effort: Pulmonary effort is normal. No respiratory distress.      Breath sounds: Normal breath sounds. No wheezing or rales.   Musculoskeletal:      Cervical back: Neck supple.      Right lower leg: No edema.      Left lower leg: No edema.   Skin:     General: Skin is warm and dry.      Capillary Refill: Capillary refill takes less than 2 seconds.      Coloration: Skin is not jaundiced.   Neurological:      Mental Status: He is alert and oriented to person, place, and time. Mental status is at baseline.   Psychiatric:         Mood and Affect: Mood normal.         Behavior: Behavior normal.         EKG: reviewed  ECHO: reviewed  Previous Cath/PCI: hx of prior lhc, ooh no images, reported as normal

## 2025-03-17 ENCOUNTER — HOSPITAL ENCOUNTER (OUTPATIENT)
Facility: HOSPITAL | Age: 45
Setting detail: OUTPATIENT SURGERY
Discharge: HOME | End: 2025-03-17
Attending: INTERNAL MEDICINE | Admitting: INTERNAL MEDICINE
Payer: COMMERCIAL

## 2025-03-17 VITALS
OXYGEN SATURATION: 96 % | TEMPERATURE: 99 F | DIASTOLIC BLOOD PRESSURE: 70 MMHG | SYSTOLIC BLOOD PRESSURE: 118 MMHG | RESPIRATION RATE: 16 BRPM | WEIGHT: 294.53 LBS | HEART RATE: 66 BPM | BODY MASS INDEX: 42.26 KG/M2

## 2025-03-17 DIAGNOSIS — I20.9 ANGINA, CLASS III (HCC): ICD-10-CM

## 2025-03-17 DIAGNOSIS — E66.813 CLASS 3 SEVERE OBESITY WITH SERIOUS COMORBIDITY AND BODY MASS INDEX (BMI) OF 40.0 TO 44.9 IN ADULT, UNSPECIFIED OBESITY TYPE (HCC): ICD-10-CM

## 2025-03-17 DIAGNOSIS — Z72.0 TOBACCO ABUSE: ICD-10-CM

## 2025-03-17 DIAGNOSIS — E11.59 TYPE 2 DIABETES MELLITUS WITH OTHER CIRCULATORY COMPLICATION, WITHOUT LONG-TERM CURRENT USE OF INSULIN (HCC): ICD-10-CM

## 2025-03-17 DIAGNOSIS — R93.1 ABNORMAL CARDIAC CT ANGIOGRAPHY: ICD-10-CM

## 2025-03-17 DIAGNOSIS — E66.01 CLASS 3 SEVERE OBESITY WITH SERIOUS COMORBIDITY AND BODY MASS INDEX (BMI) OF 40.0 TO 44.9 IN ADULT, UNSPECIFIED OBESITY TYPE (HCC): ICD-10-CM

## 2025-03-17 DIAGNOSIS — I25.10 NONOBSTRUCTIVE ATHEROSCLEROSIS OF CORONARY ARTERY: Primary | ICD-10-CM

## 2025-03-17 LAB
ATRIAL RATE: 62 BPM
P AXIS: 19 DEGREES
PR INTERVAL: 150 MS
QRS AXIS: 42 DEGREES
QRSD INTERVAL: 78 MS
QT INTERVAL: 398 MS
QTC INTERVAL: 405 MS
T WAVE AXIS: -25 DEGREES
VENTRICULAR RATE: 62 BPM

## 2025-03-17 PROCEDURE — C1769 GUIDE WIRE: HCPCS | Performed by: INTERNAL MEDICINE

## 2025-03-17 PROCEDURE — 99152 MOD SED SAME PHYS/QHP 5/>YRS: CPT | Performed by: INTERNAL MEDICINE

## 2025-03-17 PROCEDURE — 93010 ELECTROCARDIOGRAM REPORT: CPT | Performed by: STUDENT IN AN ORGANIZED HEALTH CARE EDUCATION/TRAINING PROGRAM

## 2025-03-17 PROCEDURE — 93458 L HRT ARTERY/VENTRICLE ANGIO: CPT | Performed by: INTERNAL MEDICINE

## 2025-03-17 PROCEDURE — 99153 MOD SED SAME PHYS/QHP EA: CPT | Performed by: INTERNAL MEDICINE

## 2025-03-17 PROCEDURE — C1887 CATHETER, GUIDING: HCPCS | Performed by: INTERNAL MEDICINE

## 2025-03-17 PROCEDURE — 93005 ELECTROCARDIOGRAM TRACING: CPT

## 2025-03-17 PROCEDURE — NC001 PR NO CHARGE: Performed by: INTERNAL MEDICINE

## 2025-03-17 PROCEDURE — C1894 INTRO/SHEATH, NON-LASER: HCPCS | Performed by: INTERNAL MEDICINE

## 2025-03-17 RX ORDER — FENTANYL CITRATE 50 UG/ML
INJECTION, SOLUTION INTRAMUSCULAR; INTRAVENOUS CODE/TRAUMA/SEDATION MEDICATION
Status: DISCONTINUED | OUTPATIENT
Start: 2025-03-17 | End: 2025-03-17 | Stop reason: HOSPADM

## 2025-03-17 RX ORDER — AMLODIPINE BESYLATE 2.5 MG/1
2.5 TABLET ORAL DAILY
Qty: 30 TABLET | Refills: 0 | Status: SHIPPED | OUTPATIENT
Start: 2025-03-17

## 2025-03-17 RX ORDER — VERAPAMIL HYDROCHLORIDE 2.5 MG/ML
INJECTION, SOLUTION INTRAVENOUS CODE/TRAUMA/SEDATION MEDICATION
Status: DISCONTINUED | OUTPATIENT
Start: 2025-03-17 | End: 2025-03-17 | Stop reason: HOSPADM

## 2025-03-17 RX ORDER — SODIUM CHLORIDE 9 MG/ML
125 INJECTION, SOLUTION INTRAVENOUS CONTINUOUS
Status: DISCONTINUED | OUTPATIENT
Start: 2025-03-17 | End: 2025-03-17

## 2025-03-17 RX ORDER — LIDOCAINE HYDROCHLORIDE 10 MG/ML
INJECTION, SOLUTION EPIDURAL; INFILTRATION; INTRACAUDAL; PERINEURAL CODE/TRAUMA/SEDATION MEDICATION
Status: DISCONTINUED | OUTPATIENT
Start: 2025-03-17 | End: 2025-03-17 | Stop reason: HOSPADM

## 2025-03-17 RX ORDER — MIDAZOLAM HYDROCHLORIDE 2 MG/2ML
INJECTION, SOLUTION INTRAMUSCULAR; INTRAVENOUS CODE/TRAUMA/SEDATION MEDICATION
Status: DISCONTINUED | OUTPATIENT
Start: 2025-03-17 | End: 2025-03-17 | Stop reason: HOSPADM

## 2025-03-17 RX ORDER — OXYCODONE AND ACETAMINOPHEN 5; 325 MG/1; MG/1
1 TABLET ORAL EVERY 4 HOURS PRN
Status: DISCONTINUED | OUTPATIENT
Start: 2025-03-17 | End: 2025-03-17 | Stop reason: HOSPADM

## 2025-03-17 RX ORDER — ACETAMINOPHEN 325 MG/1
975 TABLET ORAL ONCE AS NEEDED
Status: DISCONTINUED | OUTPATIENT
Start: 2025-03-17 | End: 2025-03-17 | Stop reason: HOSPADM

## 2025-03-17 RX ORDER — ONDANSETRON 2 MG/ML
4 INJECTION INTRAMUSCULAR; INTRAVENOUS ONCE AS NEEDED
Status: DISCONTINUED | OUTPATIENT
Start: 2025-03-17 | End: 2025-03-17 | Stop reason: HOSPADM

## 2025-03-17 RX ORDER — NITROGLYCERIN 20 MG/100ML
INJECTION INTRAVENOUS CODE/TRAUMA/SEDATION MEDICATION
Status: DISCONTINUED | OUTPATIENT
Start: 2025-03-17 | End: 2025-03-17 | Stop reason: HOSPADM

## 2025-03-17 RX ORDER — HEPARIN SODIUM 1000 [USP'U]/ML
INJECTION, SOLUTION INTRAVENOUS; SUBCUTANEOUS CODE/TRAUMA/SEDATION MEDICATION
Status: DISCONTINUED | OUTPATIENT
Start: 2025-03-17 | End: 2025-03-17 | Stop reason: HOSPADM

## 2025-03-17 RX ORDER — SODIUM CHLORIDE 9 MG/ML
100 INJECTION, SOLUTION INTRAVENOUS CONTINUOUS
Status: DISPENSED | OUTPATIENT
Start: 2025-03-17 | End: 2025-03-17

## 2025-03-17 RX ORDER — NITROGLYCERIN 0.4 MG/1
0.4 TABLET SUBLINGUAL ONCE AS NEEDED
Status: DISCONTINUED | OUTPATIENT
Start: 2025-03-17 | End: 2025-03-17 | Stop reason: HOSPADM

## 2025-03-17 RX ORDER — ASPIRIN 81 MG/1
324 TABLET, CHEWABLE ORAL ONCE
Status: COMPLETED | OUTPATIENT
Start: 2025-03-17 | End: 2025-03-17

## 2025-03-17 RX ADMIN — SODIUM CHLORIDE 125 ML/HR: 0.9 INJECTION, SOLUTION INTRAVENOUS at 07:32

## 2025-03-17 RX ADMIN — ASPIRIN 81 MG CHEWABLE TABLET 243 MG: 81 TABLET CHEWABLE at 07:08

## 2025-03-17 NOTE — DISCHARGE INSTR - AVS FIRST PAGE
1. Please see the post cardiac catheterization dishcarge instructions.   No heavy lifting, greater than 10 lbs. or strenuous  activity for 48 hrs.    2.Remove band aid tomorrow.  Shower and wash area- wrist gently with soap and water- beginning tomorrow. Rinse and pat dry.  Apply new water seal band aid.  Repeat this process for 5 days. No powders, creams lotions or antibiotic ointments  for 5 days.  No tub baths, hot tubs or swimming for 5 days.     3. Please call our office ([phone]) if you have any fever, redness, swelling, discharge from your wrist access site.    4.No driving for 1 day

## 2025-03-20 ENCOUNTER — TELEPHONE (OUTPATIENT)
Dept: PULMONOLOGY | Facility: HOSPITAL | Age: 45
End: 2025-03-20

## 2025-03-20 NOTE — TELEPHONE ENCOUNTER
No answer, left message for pt to see if he would like an earlier cardiac rehab evaluation appointment. Call back number was left.

## 2025-04-16 NOTE — PROGRESS NOTES
Cardiology Follow Up    Subhash Jennings  1980 [id number]  [facility] [address] [id number] [phone]    1. Nonobstructive atherosclerosis of coronary artery  POCT ECG      2. Primary hypertension        3. Other hyperlipidemia  Lipid Panel with Direct LDL reflex      4. Angina, class III (HCC)  amLODIPine (NORVASC) 2.5 mg tablet          Discussion/Summary:  CAD:  Initially presented for evaluation in February 2025 where he had been experiencing chest pain and shortness of breath  Had abnormal stress test 10 years prior leading to left heart catheterization with no significant blockages  Obtained cardiac CTA which showed showed 50 to 70% stenosis mid LAD, mild to moderate stenosis of proximal LAD, mild stenosis involving left circumflex and RCA  Subsequently had left heart catheterization on 03/17 where nonobstructive disease was visualized, medical management was recommended  Since his catheterization he has been feeling great, has not had any chest pain or shortness of breath since his initial episode several months ago  Continue aspirin, beta-blocker, statin    HTN:  BP today 118/62  Continue amlodipine 2.5 mg daily, losartan 25 mg daily, metoprolol succinate 25 mg daily    HLD:  LDL goal < 70  Has been on atorvastatin 20 mg for about 3 months  Lipid panel 1/1/2025: C 220, T 251, H 27, L 143  Patient counseled on diet/exercise modifications  Will repeat lipids in 3 months    T2DM:  A1c 10.2  A1c goal < 7, has appointment to establish with endocrinology  On metformin, may benefit from Jardiance in the future    Left bundle branch block:  Was observed on EKG in December 2024, absent from subsequent EKGs including today    Tobacco Use:  Was smoking 2 PPD, down to 1 PPD  Planning to quit in the near future    Patient should RTO in 6 months or sooner if necessary.     Interval History:    Subhash Jennings is a 44 y.o. male with nonobstructive CAD on recent catheterization in March 2025, hypertension, hyperlipidemia, new onset diabetes who recently began seeing Dr. Boudreaux.     He initially presented for evaluation in February 2025 where he had been experiencing chest pain and shortness of breath with some typical symptoms. He had an abnormal stress about 10 years prior which led to LHC where there were no significant blockages. Patient was started on metoprolol at this time. Obtained cardiac CTA which showed 50 to 70% stenosis mid LAD, mild to moderate stenosis of proximal LAD, mild stenosis involving left circumflex and RCA. Subsequently had LHC on 3/17 which showed mid LAD with a 45% lesion and ostial VIJAY 2 also with a 50% lesion and ZAHEER 3 flow, both amenable to medical management at this time, diffuse CAD throughout the coronary anatomy in the RCA and LCX.     Natalie presents to the office today for follow-up.  Since his heart catheterization he reports feeling great.  He has had no recurrence of chest pain or shortness of breath since his initial episode several months ago.  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  He has been compliant with all medications.  Denies palpitations, lightheadedness, dizziness, syncope.  Denies weight gain, orthopnea, PND, lower extremity edema.  He has recently stopped drinking soda, which he was having over half a gallon of Pepsi a day.  He has also cut back on his smoking, down to 1 pack/day from 2 packs/day.  Started atorvastatin 20 mg several months ago, will request repeat lipids in approximately 3 months.  Blood pressure is well-controlled today.    Medical Problems       Problem List       Symptomatic cholelithiasis    Other chest pain    Tobacco abuse    Primary hypertension    Other hyperlipidemia    Elevated glucose    Elevated LFTs    LBBB (left bundle branch block)    Chest pain     Nonobstructive atherosclerosis of coronary artery        Past Medical History:   Diagnosis Date    Cholelithiases     False positive stress test     Pt states his PCP had him do a stress test. Pt had a false +. Had a clean cardiac cath.    GERD (gastroesophageal reflux disease)     Pt takes Protonix.    HLD (hyperlipidemia)     Hypertension     Loss of teeth due to extraction     Pneumonia     1/20     Social History     Socioeconomic History    Marital status: /Civil Union     Spouse name: Not on file    Number of children: Not on file    Years of education: Not on file    Highest education level: Not on file   Occupational History    Not on file   Tobacco Use    Smoking status: Every Day     Current packs/day: 2.00     Average packs/day: 2.0 packs/day for 20.0 years (40.0 ttl pk-yrs)     Types: Cigarettes    Smokeless tobacco: Never    Tobacco comments:     Two packs a day,l ast cigarette 3/17   Vaping Use    Vaping status: Never Used   Substance and Sexual Activity    Alcohol use: Yes    Drug use: Not Currently    Sexual activity: Not on file   Other Topics Concern    Not on file   Social History Narrative    Not on file     Social Drivers of Health     Financial Resource Strain: Not on file   Food Insecurity: No Food Insecurity (12/31/2024)    Nursing - Inadequate Food Risk Classification     Worried About Running Out of Food in the Last Year: Not on file     Ran Out of Food in the Last Year: Not on file     Ran Out of Food in the Last Year: Never true   Transportation Needs: No Transportation Needs (12/31/2024)    Nursing - Transportation Risk Classification     Lack of Transportation: Not on file     Lack of Transportation: No   Physical Activity: Not on file   Stress: Not on file   Social Connections: Unknown (6/18/2024)    Received from [employer]    Social Connections     How often do you feel lonely or isolated from those around you? (Adult - for ages 18 years and over): Not on file   Intimate  Partner Violence: Unknown (2024)    Nursing IPS     Feels Physically and Emotionally Safe: Not on file     Physically Hurt by Someone: Not on file     Humiliated or Emotionally Abused by Someone: Not on file     Physically Hurt by Someone: No     Hurt or Threatened by Someone: No   Housing Stability: Unknown (2024)    Nursing: Inadequate Housing Risk Classification     Has Housing: Not on file     Worried About Losing Housing: Not on file     Unable to Get Utilities: Not on file     Unable to Pay for Housing in the Last Year: No     Has Housin      Family History   Problem Relation Age of Onset    Coronary artery disease Father      Past Surgical History:   Procedure Laterality Date    CARDIAC CATHETERIZATION Left 3/17/2025    Procedure: Cardiac Left Heart Cath;  Surgeon: Nahed Sheriff DO;  Location: [facility];  Service: Cardiology    CARDIAC CATHETERIZATION N/A 3/17/2025    Procedure: Cardiac Coronary Angiogram;  Surgeon: Nahed Sheriff DO;  Location: [facility];  Service: Cardiology    CARDIAC CATHETERIZATION  3/17/2025    Procedure: Left Heart Catherization;  Surgeon: Nahed Sheriff DO;  Location: [facility];  Service: Cardiology    CARDIAC SURGERY      cardiac cath done after abnormal stress test. Clean cath.    FOREIGN BODY REMOVAL Right     Pt had glass removed from 5th finger    [place] LAPAROSCOPY SURG CHOLECYSTECTOMY N/A 3/27/2020    Procedure: LAPAROSCOPIC CHOLECYSTECTOMY;  Surgeon: Debi Robb DO;  Location:  MAIN OR;  Service: General    WISDOM TOOTH EXTRACTION         Current Outpatient Medications:     amLODIPine (NORVASC) 2.5 mg tablet, Take 1 tablet (2.5 mg total) by mouth daily, Disp: 90 tablet, Rfl: 3    aspirin 81 mg chewable tablet, Chew 1 tablet (81 mg total) daily, Disp: 30 tablet, Rfl: 0    losartan (COZAAR) 25 mg tablet, Take 50 mg by mouth 2 (two) times a day, Disp: , Rfl:     metFORMIN (GLUCOPHAGE) 500 mg tablet, Take 500 mg by  "mouth daily with breakfast, Disp: , Rfl:     metoprolol succinate (TOPROL-XL) 25 mg 24 hr tablet, Take 1 tablet (25 mg total) by mouth daily, Disp: 90 tablet, Rfl: 3    pantoprazole (PROTONIX) 40 mg tablet, Take 40 mg by mouth daily, Disp: , Rfl:   No Known Allergies    Labs:     Chemistry        Component Value Date/Time    K 4.0 03/13/2025 0835    K 4.0 03/31/2023 1655     03/13/2025 0835     03/31/2023 1655    CO2 26 03/13/2025 0835    CO2 25 03/31/2023 1655    BUN 11 03/13/2025 0835    BUN 13 03/31/2023 1655    BUN 15 03/15/2020 0519    CREATININE 0.65 03/13/2025 0835    CREATININE 0.8 03/31/2023 1655        Component Value Date/Time    CALCIUM 9.0 03/13/2025 0835    CALCIUM 8.9 03/31/2023 1655    ALKPHOS 81 03/13/2025 0835    ALKPHOS 80 03/31/2023 1655    AST 33 03/13/2025 0835    AST 24 03/31/2023 1655    ALT 51 03/13/2025 0835    ALT 47 03/31/2023 1655            No results found for: \"CHOL\"  Lab Results   Component Value Date    HDL 27 (L) 01/01/2025     Lab Results   Component Value Date    LDLCALC 143 (H) 01/01/2025     Lab Results   Component Value Date    TRIG 251 (H) 01/01/2025    TRIG 434 (H) 09/10/2021     No results found for: \"CHOLHDL\"    Imaging: No results found.    ECG: Normal sinus rhythm, nonspecific T wave abnormality, 66 bpm      Review of Systems   All other systems reviewed and are negative.      Vitals:    04/17/25 1241   BP: 118/62   Pulse: 65   SpO2: 98%     Vitals:    04/17/25 1241   Weight: 135 kg (297 lb 9.6 oz)     Height: 5' 10\" (177.8 cm)   Body mass index is 42.7 kg/m².    Physical Exam  Vitals reviewed.   Constitutional:       General: He is not in acute distress.     Appearance: He is obese. He is not diaphoretic.   HENT:      Head: Normocephalic and atraumatic.   Eyes:      Pupils: Pupils are equal, round, and reactive to light.   Neck:      Vascular: No carotid bruit.   Cardiovascular:      Rate and Rhythm: Normal rate and regular rhythm.      Pulses: Normal " pulses.      Heart sounds: Normal heart sounds. No murmur heard.  Pulmonary:      Effort: Pulmonary effort is normal.      Breath sounds: Normal breath sounds. No wheezing, rhonchi or rales.   Abdominal:      General: There is no distension.      Palpations: Abdomen is soft.      Tenderness: There is no abdominal tenderness.   Musculoskeletal:      Cervical back: Normal range of motion.      Right lower leg: No edema.      Left lower leg: No edema.   Skin:     General: Skin is warm.      Capillary Refill: Capillary refill takes less than 2 seconds.   Neurological:      General: No focal deficit present.      Mental Status: He is alert and oriented to person, place, and time. Mental status is at baseline.      Gait: Gait normal.   Psychiatric:         Mood and Affect: Mood normal.         Behavior: Behavior normal.         Thought Content: Thought content normal.

## 2025-04-17 ENCOUNTER — OFFICE VISIT (OUTPATIENT)
Dept: CARDIOLOGY CLINIC | Facility: HOSPITAL | Age: 45
End: 2025-04-17
Payer: COMMERCIAL

## 2025-04-17 VITALS
BODY MASS INDEX: 42.61 KG/M2 | WEIGHT: 297.6 LBS | DIASTOLIC BLOOD PRESSURE: 62 MMHG | HEART RATE: 65 BPM | HEIGHT: 70 IN | OXYGEN SATURATION: 98 % | SYSTOLIC BLOOD PRESSURE: 118 MMHG

## 2025-04-17 DIAGNOSIS — I20.9 ANGINA, CLASS III (HCC): ICD-10-CM

## 2025-04-17 DIAGNOSIS — I25.10 NONOBSTRUCTIVE ATHEROSCLEROSIS OF CORONARY ARTERY: Primary | ICD-10-CM

## 2025-04-17 DIAGNOSIS — E78.49 OTHER HYPERLIPIDEMIA: ICD-10-CM

## 2025-04-17 DIAGNOSIS — I10 PRIMARY HYPERTENSION: ICD-10-CM

## 2025-04-17 PROCEDURE — 99214 OFFICE O/P EST MOD 30 MIN: CPT

## 2025-04-17 PROCEDURE — 93000 ELECTROCARDIOGRAM COMPLETE: CPT

## 2025-04-17 RX ORDER — AMLODIPINE BESYLATE 2.5 MG/1
2.5 TABLET ORAL DAILY
Qty: 90 TABLET | Refills: 3 | Status: SHIPPED | OUTPATIENT
Start: 2025-04-17

## (undated) DEVICE — TROCAR: Brand: KII SLEEVE

## (undated) DEVICE — VIAL DECANTER

## (undated) DEVICE — LIGACLIP 10-M/L, 10MM ENDOSCOPIC ROTATING MULTIPLE CLIP APPLIERS: Brand: LIGACLIP

## (undated) DEVICE — GUIDEWIRE WHOLEY HI TORQUE INTERM MOD J .035 145CM

## (undated) DEVICE — GLOVE SRG BIOGEL 6

## (undated) DEVICE — TUBING INSUFFLATION SET ISO CONNECTOR

## (undated) DEVICE — DRAPE EQUIPMENT RF WAND

## (undated) DEVICE — IRRIG ENDO FLO TUBING

## (undated) DEVICE — GLOVE INDICATOR PI UNDERGLOVE SZ 6.5 BLUE

## (undated) DEVICE — CATH GUIDE LAUNCHER 6FR EBU 3.5

## (undated) DEVICE — SUT VICRYL 0 REEL 54 IN J287G

## (undated) DEVICE — RADIFOCUS OPTITORQUE ANGIOGRAPHIC CATHETER: Brand: OPTITORQUE

## (undated) DEVICE — INTENDED FOR TISSUE SEPARATION, AND OTHER PROCEDURES THAT REQUIRE A SHARP SURGICAL BLADE TO PUNCTURE OR CUT.: Brand: BARD-PARKER SAFETY BLADES SIZE 11, STERILE

## (undated) DEVICE — CHLORAPREP HI-LITE 26ML ORANGE

## (undated) DEVICE — PMI DISPOSABLE PUNCTURE CLOSURE DEVICE / SUTURE GRASPER: Brand: PMI

## (undated) DEVICE — TROCAR: Brand: KII FIOS FIRST ENTRY

## (undated) DEVICE — ENDOPOUCH RETRIEVER SPECIMEN RETRIEVAL BAGS: Brand: ENDOPOUCH RETRIEVER

## (undated) DEVICE — DGW .035 FC J3MM 260CM TEF: Brand: EMERALD

## (undated) DEVICE — TR BAND RADIAL ARTERY COMPRESSION DEVICE: Brand: TR BAND

## (undated) DEVICE — ADHESIVE SKIN HIGH VISCOSITY EXOFIN 1ML

## (undated) DEVICE — ENDOPATH PNEUMONEEDLE INSUFFLATION NEEDLES WITH LUER LOCK CONNECTORS 120MM: Brand: ENDOPATH

## (undated) DEVICE — GLIDESHEATH BASIC HYDROPHILIC COATED INTRODUCER SHEATH: Brand: GLIDESHEATH

## (undated) DEVICE — SINGLE PORT MANIFOLD: Brand: NEPTUNE 2

## (undated) DEVICE — VISUALIZATION SYSTEM: Brand: CLEARIFY

## (undated) DEVICE — ELECTRODE LAP J HOOK E-Z CLEAN 33CM-0021

## (undated) DEVICE — PENCIL ELECTROSURG E-Z CLEAN -0035H

## (undated) DEVICE — NEEDLE 25G X 1 1/2

## (undated) DEVICE — SYRINGE 50ML LL

## (undated) DEVICE — SUT VICRYL 4-0 PS-2 27 IN J426H

## (undated) DEVICE — ALLENTOWN LAP CHOLE APP PACK: Brand: CARDINAL HEALTH

## (undated) DEVICE — PAD GROUNDING ADULT